# Patient Record
Sex: FEMALE | Race: BLACK OR AFRICAN AMERICAN | Employment: OTHER | URBAN - METROPOLITAN AREA
[De-identification: names, ages, dates, MRNs, and addresses within clinical notes are randomized per-mention and may not be internally consistent; named-entity substitution may affect disease eponyms.]

---

## 2017-07-06 PROBLEM — C50.412 MALIGNANT NEOPLASM OF UPPER-OUTER QUADRANT OF LEFT FEMALE BREAST (HCC): Status: ACTIVE | Noted: 2017-07-06

## 2017-07-14 ENCOUNTER — HOSPITAL ENCOUNTER (OUTPATIENT)
Dept: GENERAL RADIOLOGY | Age: 72
Discharge: HOME | End: 2017-07-14
Attending: SURGERY | Admitting: SURGERY

## 2017-07-14 LAB
BUN BLDV-MCNC: 24 MG/DL (ref 6–23)
CREAT SERPL-MCNC: 1.5 MG/DL (ref 0.6–1.1)
GFR AFRICAN AMERICAN: 41 ML/MIN/1.73M2
GFR NON-AFRICAN AMERICAN: 34 ML/MIN/1.73M2

## 2017-07-18 ENCOUNTER — HOSPITAL ENCOUNTER (OUTPATIENT)
Dept: MRI IMAGING | Age: 72
Discharge: HOME | End: 2017-08-16
Attending: SURGERY | Admitting: SURGERY

## 2017-07-18 DIAGNOSIS — C50.412 MALIGNANT NEOPLASM OF UPPER-OUTER QUADRANT OF LEFT FEMALE BREAST (HCC): ICD-10-CM

## 2017-08-18 ENCOUNTER — HOSPITAL ENCOUNTER (OUTPATIENT)
Dept: OTHER | Age: 72
Discharge: HOME | End: 2017-08-18
Attending: INTERNAL MEDICINE | Admitting: INTERNAL MEDICINE

## 2017-08-18 LAB
ALBUMIN SERPL-MCNC: 4.3 GM/DL (ref 3.4–5)
ALP BLD-CCNC: 95 IU/L (ref 40–129)
ALT SERPL-CCNC: 13 U/L (ref 10–40)
ANION GAP SERPL CALCULATED.3IONS-SCNC: 13 MMOL/L (ref 4–16)
AST SERPL-CCNC: 18 IU/L (ref 15–37)
BILIRUB SERPL-MCNC: 0.7 MG/DL (ref 0–1)
BUN BLDV-MCNC: 20 MG/DL (ref 6–23)
CALCIUM SERPL-MCNC: 9.5 MG/DL (ref 8.3–10.6)
CHLORIDE BLD-SCNC: 105 MMOL/L (ref 99–110)
CO2: 26 MMOL/L (ref 21–32)
CREAT SERPL-MCNC: 1.5 MG/DL (ref 0.6–1.1)
GFR AFRICAN AMERICAN: 41 ML/MIN/1.73M2
GFR NON-AFRICAN AMERICAN: 34 ML/MIN/1.73M2
GLUCOSE BLD-MCNC: 83 MG/DL (ref 70–140)
POTASSIUM SERPL-SCNC: 4.2 MMOL/L (ref 3.5–5.1)
SODIUM BLD-SCNC: 144 MMOL/L (ref 135–145)
TOTAL PROTEIN: 7 GM/DL (ref 6.4–8.2)

## 2017-08-20 LAB — CA 27.29: 25.8 U/ML

## 2017-08-30 ENCOUNTER — HOSPITAL ENCOUNTER (OUTPATIENT)
Dept: CARDIOLOGY | Age: 72
Discharge: HOME | End: 2017-08-30
Attending: INTERNAL MEDICINE | Admitting: INTERNAL MEDICINE

## 2017-08-30 DIAGNOSIS — C50.412 MALIGNANT NEOPLASM OF UPPER-OUTER QUADRANT OF LEFT FEMALE BREAST (HCC): ICD-10-CM

## 2017-08-30 LAB
LV EF: 50 %
LVEF MODALITY: NORMAL

## 2017-09-07 ENCOUNTER — HOSPITAL ENCOUNTER (OUTPATIENT)
Dept: OTHER | Age: 72
Discharge: HOME | End: 2017-09-07
Attending: INTERNAL MEDICINE | Admitting: INTERNAL MEDICINE

## 2017-09-07 LAB
ALBUMIN SERPL-MCNC: 3.9 GM/DL (ref 3.4–5)
ALP BLD-CCNC: 101 IU/L (ref 40–128)
ALT SERPL-CCNC: 14 U/L (ref 10–40)
ANION GAP SERPL CALCULATED.3IONS-SCNC: 13 MMOL/L (ref 4–16)
AST SERPL-CCNC: 20 IU/L (ref 15–37)
BILIRUB SERPL-MCNC: 0.6 MG/DL (ref 0–1)
BUN BLDV-MCNC: 20 MG/DL (ref 6–23)
CALCIUM SERPL-MCNC: 9.7 MG/DL (ref 8.3–10.6)
CHLORIDE BLD-SCNC: 106 MMOL/L (ref 99–110)
CO2: 25 MMOL/L (ref 21–32)
CREAT SERPL-MCNC: 1.3 MG/DL (ref 0.6–1.1)
GFR AFRICAN AMERICAN: 49 ML/MIN/1.73M2
GFR NON-AFRICAN AMERICAN: 40 ML/MIN/1.73M2
GLUCOSE BLD-MCNC: 89 MG/DL (ref 70–140)
POTASSIUM SERPL-SCNC: 4.2 MMOL/L (ref 3.5–5.1)
SODIUM BLD-SCNC: 144 MMOL/L (ref 135–145)
TOTAL PROTEIN: 6.6 GM/DL (ref 6.4–8.2)

## 2017-10-19 ENCOUNTER — HOSPITAL ENCOUNTER (OUTPATIENT)
Dept: OTHER | Age: 72
Discharge: HOME | End: 2017-10-19
Attending: INTERNAL MEDICINE | Admitting: INTERNAL MEDICINE

## 2017-10-19 LAB
ALBUMIN SERPL-MCNC: 3.6 GM/DL (ref 3.4–5)
ALP BLD-CCNC: 96 IU/L (ref 40–128)
ALT SERPL-CCNC: 14 U/L (ref 10–40)
ANION GAP SERPL CALCULATED.3IONS-SCNC: 13 MMOL/L (ref 4–16)
AST SERPL-CCNC: 16 IU/L (ref 15–37)
BILIRUB SERPL-MCNC: 0.2 MG/DL (ref 0–1)
BUN BLDV-MCNC: 22 MG/DL (ref 6–23)
CALCIUM SERPL-MCNC: 9.2 MG/DL (ref 8.3–10.6)
CHLORIDE BLD-SCNC: 105 MMOL/L (ref 99–110)
CO2: 25 MMOL/L (ref 21–32)
CREAT SERPL-MCNC: 1.4 MG/DL (ref 0.6–1.1)
GFR AFRICAN AMERICAN: 45 ML/MIN/1.73M2
GFR NON-AFRICAN AMERICAN: 37 ML/MIN/1.73M2
GLUCOSE BLD-MCNC: 96 MG/DL (ref 70–140)
POTASSIUM SERPL-SCNC: 4.6 MMOL/L (ref 3.5–5.1)
SODIUM BLD-SCNC: 143 MMOL/L (ref 135–145)
TOTAL PROTEIN: 6.1 GM/DL (ref 6.4–8.2)

## 2017-11-09 ENCOUNTER — HOSPITAL ENCOUNTER (OUTPATIENT)
Dept: OTHER | Age: 72
Discharge: HOME | End: 2017-11-09
Attending: INTERNAL MEDICINE | Admitting: INTERNAL MEDICINE

## 2017-11-09 LAB
ALBUMIN SERPL-MCNC: 3.6 GM/DL (ref 3.4–5)
ALP BLD-CCNC: 107 IU/L (ref 40–129)
ALT SERPL-CCNC: 13 U/L (ref 10–40)
ANION GAP SERPL CALCULATED.3IONS-SCNC: 13 MMOL/L (ref 4–16)
AST SERPL-CCNC: 15 IU/L (ref 15–37)
BILIRUB SERPL-MCNC: 0.2 MG/DL (ref 0–1)
BUN BLDV-MCNC: 20 MG/DL (ref 6–23)
CALCIUM SERPL-MCNC: 9.2 MG/DL (ref 8.3–10.6)
CHLORIDE BLD-SCNC: 104 MMOL/L (ref 99–110)
CO2: 24 MMOL/L (ref 21–32)
CREAT SERPL-MCNC: 1.2 MG/DL (ref 0.6–1.1)
GFR AFRICAN AMERICAN: 53 ML/MIN/1.73M2
GFR NON-AFRICAN AMERICAN: 44 ML/MIN/1.73M2
GLUCOSE BLD-MCNC: 93 MG/DL (ref 70–140)
POTASSIUM SERPL-SCNC: 4.3 MMOL/L (ref 3.5–5.1)
SODIUM BLD-SCNC: 141 MMOL/L (ref 135–145)
TOTAL PROTEIN: 5.9 GM/DL (ref 6.4–8.2)

## 2017-11-30 ENCOUNTER — HOSPITAL ENCOUNTER (OUTPATIENT)
Dept: OTHER | Age: 72
Discharge: HOME | End: 2017-11-30
Attending: INTERNAL MEDICINE | Admitting: INTERNAL MEDICINE

## 2017-11-30 LAB
ALBUMIN SERPL-MCNC: 3.9 GM/DL (ref 3.4–5)
ALP BLD-CCNC: 102 IU/L (ref 40–128)
ALT SERPL-CCNC: 14 U/L (ref 10–40)
ANION GAP SERPL CALCULATED.3IONS-SCNC: 14 MMOL/L (ref 4–16)
AST SERPL-CCNC: 19 IU/L (ref 15–37)
BILIRUB SERPL-MCNC: 0.3 MG/DL (ref 0–1)
BUN BLDV-MCNC: 17 MG/DL (ref 6–23)
CALCIUM SERPL-MCNC: 9.6 MG/DL (ref 8.3–10.6)
CHLORIDE BLD-SCNC: 107 MMOL/L (ref 99–110)
CO2: 21 MMOL/L (ref 21–32)
CREAT SERPL-MCNC: 1.2 MG/DL (ref 0.6–1.1)
GFR AFRICAN AMERICAN: 53 ML/MIN/1.73M2
GFR NON-AFRICAN AMERICAN: 44 ML/MIN/1.73M2
GLUCOSE BLD-MCNC: 157 MG/DL (ref 70–99)
POTASSIUM SERPL-SCNC: 4.1 MMOL/L (ref 3.5–5.1)
SODIUM BLD-SCNC: 142 MMOL/L (ref 135–145)
TOTAL PROTEIN: 6.5 GM/DL (ref 6.4–8.2)

## 2017-12-07 ENCOUNTER — HOSPITAL ENCOUNTER (OUTPATIENT)
Dept: OTHER | Age: 72
Discharge: HOME | End: 2017-12-07
Attending: INTERNAL MEDICINE | Admitting: INTERNAL MEDICINE

## 2017-12-07 LAB
ALBUMIN SERPL-MCNC: 3.5 GM/DL (ref 3.4–5)
ALP BLD-CCNC: 91 IU/L (ref 40–128)
ALT SERPL-CCNC: 14 U/L (ref 10–40)
ANION GAP SERPL CALCULATED.3IONS-SCNC: 12 MMOL/L (ref 4–16)
AST SERPL-CCNC: 19 IU/L (ref 15–37)
BILIRUB SERPL-MCNC: 0.3 MG/DL (ref 0–1)
BUN BLDV-MCNC: 17 MG/DL (ref 6–23)
CALCIUM SERPL-MCNC: 9.2 MG/DL (ref 8.3–10.6)
CHLORIDE BLD-SCNC: 106 MMOL/L (ref 99–110)
CO2: 25 MMOL/L (ref 21–32)
CREAT SERPL-MCNC: 1.3 MG/DL (ref 0.6–1.1)
GFR AFRICAN AMERICAN: 49 ML/MIN/1.73M2
GFR NON-AFRICAN AMERICAN: 40 ML/MIN/1.73M2
GLUCOSE BLD-MCNC: 93 MG/DL (ref 70–99)
POTASSIUM SERPL-SCNC: 4.3 MMOL/L (ref 3.5–5.1)
SODIUM BLD-SCNC: 143 MMOL/L (ref 135–145)
TOTAL PROTEIN: 6 GM/DL (ref 6.4–8.2)

## 2017-12-14 ENCOUNTER — HOSPITAL ENCOUNTER (OUTPATIENT)
Dept: OTHER | Age: 72
Discharge: HOME | End: 2017-12-14
Attending: INTERNAL MEDICINE | Admitting: INTERNAL MEDICINE

## 2017-12-14 LAB
ALBUMIN SERPL-MCNC: 3.8 GM/DL (ref 3.4–5)
ALP BLD-CCNC: 72 IU/L (ref 40–129)
ALT SERPL-CCNC: 13 U/L (ref 10–40)
ANION GAP SERPL CALCULATED.3IONS-SCNC: 13 MMOL/L (ref 4–16)
AST SERPL-CCNC: 18 IU/L (ref 15–37)
BILIRUB SERPL-MCNC: 0.4 MG/DL (ref 0–1)
BUN BLDV-MCNC: 17 MG/DL (ref 6–23)
CALCIUM SERPL-MCNC: 9.1 MG/DL (ref 8.3–10.6)
CHLORIDE BLD-SCNC: 106 MMOL/L (ref 99–110)
CO2: 24 MMOL/L (ref 21–32)
CREAT SERPL-MCNC: 1.3 MG/DL (ref 0.6–1.1)
GFR AFRICAN AMERICAN: 49 ML/MIN/1.73M2
GFR NON-AFRICAN AMERICAN: 40 ML/MIN/1.73M2
GLUCOSE BLD-MCNC: 82 MG/DL (ref 70–99)
POTASSIUM SERPL-SCNC: 3.9 MMOL/L (ref 3.5–5.1)
SODIUM BLD-SCNC: 143 MMOL/L (ref 135–145)
TOTAL PROTEIN: 5.9 GM/DL (ref 6.4–8.2)

## 2017-12-21 ENCOUNTER — HOSPITAL ENCOUNTER (OUTPATIENT)
Dept: OTHER | Age: 72
Discharge: HOME | End: 2017-12-21
Attending: INTERNAL MEDICINE | Admitting: INTERNAL MEDICINE

## 2017-12-21 LAB
ALBUMIN SERPL-MCNC: 3.5 GM/DL (ref 3.4–5)
ALP BLD-CCNC: 92 IU/L (ref 40–128)
ALT SERPL-CCNC: 14 U/L (ref 10–40)
ANION GAP SERPL CALCULATED.3IONS-SCNC: 13 MMOL/L (ref 4–16)
AST SERPL-CCNC: 23 IU/L (ref 15–37)
BILIRUB SERPL-MCNC: 0.6 MG/DL (ref 0–1)
BUN BLDV-MCNC: 12 MG/DL (ref 6–23)
CALCIUM SERPL-MCNC: 8.9 MG/DL (ref 8.3–10.6)
CHLORIDE BLD-SCNC: 107 MMOL/L (ref 99–110)
CO2: 23 MMOL/L (ref 21–32)
CREAT SERPL-MCNC: 1.2 MG/DL (ref 0.6–1.1)
GFR AFRICAN AMERICAN: 53 ML/MIN/1.73M2
GFR NON-AFRICAN AMERICAN: 44 ML/MIN/1.73M2
GLUCOSE BLD-MCNC: 102 MG/DL (ref 70–99)
POTASSIUM SERPL-SCNC: 3.7 MMOL/L (ref 3.5–5.1)
SODIUM BLD-SCNC: 143 MMOL/L (ref 135–145)
TOTAL PROTEIN: 5.9 GM/DL (ref 6.4–8.2)

## 2017-12-28 ENCOUNTER — HOSPITAL ENCOUNTER (OUTPATIENT)
Dept: OTHER | Age: 72
Discharge: HOME | End: 2017-12-28
Attending: INTERNAL MEDICINE | Admitting: INTERNAL MEDICINE

## 2017-12-28 LAB
ALBUMIN SERPL-MCNC: 3.9 GM/DL (ref 3.4–5)
ALP BLD-CCNC: 82 IU/L (ref 40–128)
ALT SERPL-CCNC: 16 U/L (ref 10–40)
ANION GAP SERPL CALCULATED.3IONS-SCNC: 13 MMOL/L (ref 4–16)
AST SERPL-CCNC: 23 IU/L (ref 15–37)
BILIRUB SERPL-MCNC: 0.5 MG/DL (ref 0–1)
BUN BLDV-MCNC: 15 MG/DL (ref 6–23)
CALCIUM SERPL-MCNC: 9.5 MG/DL (ref 8.3–10.6)
CHLORIDE BLD-SCNC: 106 MMOL/L (ref 99–110)
CO2: 25 MMOL/L (ref 21–32)
CREAT SERPL-MCNC: 1.2 MG/DL (ref 0.6–1.1)
GFR AFRICAN AMERICAN: 53 ML/MIN/1.73M2
GFR NON-AFRICAN AMERICAN: 44 ML/MIN/1.73M2
GLUCOSE BLD-MCNC: 94 MG/DL (ref 70–99)
POTASSIUM SERPL-SCNC: 3.9 MMOL/L (ref 3.5–5.1)
SODIUM BLD-SCNC: 144 MMOL/L (ref 135–145)
TOTAL PROTEIN: 6.1 GM/DL (ref 6.4–8.2)

## 2018-01-12 ENCOUNTER — HOSPITAL ENCOUNTER (OUTPATIENT)
Dept: OTHER | Age: 73
Discharge: HOME | End: 2018-01-12
Attending: INTERNAL MEDICINE | Admitting: INTERNAL MEDICINE

## 2018-01-12 LAB
ALBUMIN SERPL-MCNC: 3.8 GM/DL (ref 3.4–5)
ALP BLD-CCNC: 97 IU/L (ref 40–128)
ALT SERPL-CCNC: 12 U/L (ref 10–40)
ANION GAP SERPL CALCULATED.3IONS-SCNC: 13 MMOL/L (ref 4–16)
AST SERPL-CCNC: 21 IU/L (ref 15–37)
BILIRUB SERPL-MCNC: 0.4 MG/DL (ref 0–1)
BUN BLDV-MCNC: 19 MG/DL (ref 6–23)
CALCIUM SERPL-MCNC: 9.4 MG/DL (ref 8.3–10.6)
CHLORIDE BLD-SCNC: 106 MMOL/L (ref 99–110)
CO2: 26 MMOL/L (ref 21–32)
CREAT SERPL-MCNC: 1.2 MG/DL (ref 0.6–1.1)
GFR AFRICAN AMERICAN: 53 ML/MIN/1.73M2
GFR NON-AFRICAN AMERICAN: 44 ML/MIN/1.73M2
GLUCOSE BLD-MCNC: 97 MG/DL (ref 70–99)
POTASSIUM SERPL-SCNC: 4.1 MMOL/L (ref 3.5–5.1)
SODIUM BLD-SCNC: 145 MMOL/L (ref 135–145)
TOTAL PROTEIN: 5.9 GM/DL (ref 6.4–8.2)

## 2018-01-19 ENCOUNTER — HOSPITAL ENCOUNTER (OUTPATIENT)
Dept: OTHER | Age: 73
Discharge: HOME | End: 2018-01-19
Attending: INTERNAL MEDICINE | Admitting: INTERNAL MEDICINE

## 2018-01-19 LAB
ALBUMIN SERPL-MCNC: 3.8 GM/DL (ref 3.4–5)
ALP BLD-CCNC: 96 IU/L (ref 40–128)
ALT SERPL-CCNC: 13 U/L (ref 10–40)
ANION GAP SERPL CALCULATED.3IONS-SCNC: 13 MMOL/L (ref 4–16)
AST SERPL-CCNC: 20 IU/L (ref 15–37)
BILIRUB SERPL-MCNC: 0.3 MG/DL (ref 0–1)
BUN BLDV-MCNC: 18 MG/DL (ref 6–23)
CALCIUM SERPL-MCNC: 9.7 MG/DL (ref 8.3–10.6)
CHLORIDE BLD-SCNC: 106 MMOL/L (ref 99–110)
CO2: 25 MMOL/L (ref 21–32)
CREAT SERPL-MCNC: 1.2 MG/DL (ref 0.6–1.1)
GFR AFRICAN AMERICAN: 53 ML/MIN/1.73M2
GFR NON-AFRICAN AMERICAN: 44 ML/MIN/1.73M2
GLUCOSE BLD-MCNC: 100 MG/DL (ref 70–99)
POTASSIUM SERPL-SCNC: 4.4 MMOL/L (ref 3.5–5.1)
SODIUM BLD-SCNC: 144 MMOL/L (ref 135–145)
TOTAL PROTEIN: 6 GM/DL (ref 6.4–8.2)

## 2018-01-26 ENCOUNTER — HOSPITAL ENCOUNTER (OUTPATIENT)
Dept: OTHER | Age: 73
Discharge: HOME | End: 2018-01-26
Attending: INTERNAL MEDICINE | Admitting: INTERNAL MEDICINE

## 2018-01-26 LAB
ALBUMIN SERPL-MCNC: 3.7 GM/DL (ref 3.4–5)
ALP BLD-CCNC: 85 IU/L (ref 40–129)
ALT SERPL-CCNC: 13 U/L (ref 10–40)
ANION GAP SERPL CALCULATED.3IONS-SCNC: 12 MMOL/L (ref 4–16)
AST SERPL-CCNC: 19 IU/L (ref 15–37)
BILIRUB SERPL-MCNC: 0.3 MG/DL (ref 0–1)
BUN BLDV-MCNC: 19 MG/DL (ref 6–23)
CALCIUM SERPL-MCNC: 9.2 MG/DL (ref 8.3–10.6)
CHLORIDE BLD-SCNC: 103 MMOL/L (ref 99–110)
CO2: 24 MMOL/L (ref 21–32)
CREAT SERPL-MCNC: 1.2 MG/DL (ref 0.6–1.1)
GFR AFRICAN AMERICAN: 53 ML/MIN/1.73M2
GFR NON-AFRICAN AMERICAN: 44 ML/MIN/1.73M2
GLUCOSE BLD-MCNC: 91 MG/DL (ref 70–99)
POTASSIUM SERPL-SCNC: 4.2 MMOL/L (ref 3.5–5.1)
SODIUM BLD-SCNC: 139 MMOL/L (ref 135–145)
TOTAL PROTEIN: 5.9 GM/DL (ref 6.4–8.2)

## 2018-02-09 ENCOUNTER — HOSPITAL ENCOUNTER (OUTPATIENT)
Dept: OTHER | Age: 73
Discharge: HOME | End: 2018-02-09
Attending: NURSE PRACTITIONER | Admitting: NURSE PRACTITIONER

## 2018-02-09 LAB
ALBUMIN SERPL-MCNC: 3.8 GM/DL (ref 3.4–5)
ALP BLD-CCNC: 79 IU/L (ref 40–128)
ALT SERPL-CCNC: 13 U/L (ref 10–40)
ANION GAP SERPL CALCULATED.3IONS-SCNC: 13 MMOL/L (ref 4–16)
AST SERPL-CCNC: 20 IU/L (ref 15–37)
BILIRUB SERPL-MCNC: 0.4 MG/DL (ref 0–1)
BUN BLDV-MCNC: 15 MG/DL (ref 6–23)
CALCIUM SERPL-MCNC: 9 MG/DL (ref 8.3–10.6)
CHLORIDE BLD-SCNC: 105 MMOL/L (ref 99–110)
CO2: 24 MMOL/L (ref 21–32)
CREAT SERPL-MCNC: 1.2 MG/DL (ref 0.6–1.1)
GFR AFRICAN AMERICAN: 53 ML/MIN/1.73M2
GFR NON-AFRICAN AMERICAN: 44 ML/MIN/1.73M2
GLUCOSE BLD-MCNC: 92 MG/DL (ref 70–99)
POTASSIUM SERPL-SCNC: 4.2 MMOL/L (ref 3.5–5.1)
SODIUM BLD-SCNC: 142 MMOL/L (ref 135–145)
TOTAL PROTEIN: 5.9 GM/DL (ref 6.4–8.2)

## 2018-02-16 ENCOUNTER — HOSPITAL ENCOUNTER (OUTPATIENT)
Dept: OTHER | Age: 73
Discharge: HOME | End: 2018-02-16
Attending: INTERNAL MEDICINE | Admitting: INTERNAL MEDICINE

## 2018-02-16 LAB
ALBUMIN SERPL-MCNC: 3.9 GM/DL (ref 3.4–5)
ALP BLD-CCNC: 79 IU/L (ref 40–128)
ALT SERPL-CCNC: 15 U/L (ref 10–40)
ANION GAP SERPL CALCULATED.3IONS-SCNC: 16 MMOL/L (ref 4–16)
AST SERPL-CCNC: 22 IU/L (ref 15–37)
BILIRUB SERPL-MCNC: 0.5 MG/DL (ref 0–1)
BUN BLDV-MCNC: 13 MG/DL (ref 6–23)
CALCIUM SERPL-MCNC: 9.2 MG/DL (ref 8.3–10.6)
CHLORIDE BLD-SCNC: 104 MMOL/L (ref 99–110)
CO2: 22 MMOL/L (ref 21–32)
CREAT SERPL-MCNC: 1.3 MG/DL (ref 0.6–1.1)
GFR AFRICAN AMERICAN: 48 ML/MIN/1.73M2
GFR NON-AFRICAN AMERICAN: 40 ML/MIN/1.73M2
GLUCOSE BLD-MCNC: 108 MG/DL (ref 70–99)
POTASSIUM SERPL-SCNC: 4 MMOL/L (ref 3.5–5.1)
SODIUM BLD-SCNC: 142 MMOL/L (ref 135–145)
TOTAL PROTEIN: 6.2 GM/DL (ref 6.4–8.2)

## 2018-02-23 ENCOUNTER — HOSPITAL ENCOUNTER (OUTPATIENT)
Dept: OTHER | Age: 73
Discharge: HOME | End: 2018-02-23
Attending: INTERNAL MEDICINE | Admitting: INTERNAL MEDICINE

## 2018-02-23 LAB
ALBUMIN SERPL-MCNC: 3.8 GM/DL (ref 3.4–5)
ALP BLD-CCNC: 76 IU/L (ref 40–129)
ALT SERPL-CCNC: 13 U/L (ref 10–40)
ANION GAP SERPL CALCULATED.3IONS-SCNC: 13 MMOL/L (ref 4–16)
AST SERPL-CCNC: 20 IU/L (ref 15–37)
BILIRUB SERPL-MCNC: 0.5 MG/DL (ref 0–1)
BUN BLDV-MCNC: 14 MG/DL (ref 6–23)
CALCIUM SERPL-MCNC: 9.5 MG/DL (ref 8.3–10.6)
CHLORIDE BLD-SCNC: 102 MMOL/L (ref 99–110)
CO2: 24 MMOL/L (ref 21–32)
CREAT SERPL-MCNC: 1.2 MG/DL (ref 0.6–1.1)
GFR AFRICAN AMERICAN: 53 ML/MIN/1.73M2
GFR NON-AFRICAN AMERICAN: 44 ML/MIN/1.73M2
GLUCOSE BLD-MCNC: 92 MG/DL (ref 70–99)
POTASSIUM SERPL-SCNC: 3.7 MMOL/L (ref 3.5–5.1)
SODIUM BLD-SCNC: 139 MMOL/L (ref 135–145)
TOTAL PROTEIN: 5.9 GM/DL (ref 6.4–8.2)

## 2018-04-02 PROBLEM — I82.A12 ACUTE DEEP VEIN THROMBOSIS (DVT) OF AXILLARY VEIN OF LEFT UPPER EXTREMITY (HCC): Status: ACTIVE | Noted: 2018-04-02

## 2018-04-02 PROBLEM — M79.89 LEFT ARM SWELLING: Status: ACTIVE | Noted: 2018-04-02

## 2018-04-08 PROBLEM — L03.119 CELLULITIS OF ANKLE: Status: ACTIVE | Noted: 2018-04-08

## 2018-04-08 PROBLEM — M10.9 ACUTE GOUT OF RIGHT ANKLE: Status: ACTIVE | Noted: 2018-04-08

## 2018-09-11 ENCOUNTER — HOSPITAL ENCOUNTER (OUTPATIENT)
Dept: OTHER | Age: 73
Discharge: HOME | End: 2018-09-11
Attending: INTERNAL MEDICINE | Admitting: INTERNAL MEDICINE

## 2018-09-11 LAB
ALBUMIN SERPL-MCNC: 4 GM/DL (ref 3.4–5)
ALP BLD-CCNC: 119 IU/L (ref 40–128)
ALT SERPL-CCNC: 16 U/L (ref 10–40)
ANION GAP SERPL CALCULATED.3IONS-SCNC: 12 MMOL/L (ref 4–16)
AST SERPL-CCNC: 25 IU/L (ref 15–37)
BILIRUB SERPL-MCNC: 0.6 MG/DL (ref 0–1)
BUN BLDV-MCNC: 20 MG/DL (ref 6–23)
CALCIUM SERPL-MCNC: 9.9 MG/DL (ref 8.3–10.6)
CHLORIDE BLD-SCNC: 105 MMOL/L (ref 99–110)
CO2: 25 MMOL/L (ref 21–32)
CREAT SERPL-MCNC: 1.3 MG/DL (ref 0.6–1.1)
GFR AFRICAN AMERICAN: 48 ML/MIN/1.73M2
GFR NON-AFRICAN AMERICAN: 40 ML/MIN/1.73M2
GLUCOSE BLD-MCNC: 94 MG/DL (ref 70–99)
POTASSIUM SERPL-SCNC: 4.5 MMOL/L (ref 3.5–5.1)
SODIUM BLD-SCNC: 142 MMOL/L (ref 135–145)
TOTAL PROTEIN: 6.1 GM/DL (ref 6.4–8.2)

## 2018-09-13 LAB — CA 27.29: 21.5 U/ML

## 2018-12-11 ENCOUNTER — HOSPITAL ENCOUNTER (OUTPATIENT)
Age: 73
Setting detail: SPECIMEN
Discharge: HOME | End: 2018-12-11
Payer: MEDICARE

## 2018-12-11 LAB
ALBUMIN SERPL-MCNC: 3.6 GM/DL (ref 3.4–5)
ALP BLD-CCNC: 121 IU/L (ref 40–129)
ALT SERPL-CCNC: 15 U/L (ref 10–40)
ANION GAP SERPL CALCULATED.3IONS-SCNC: 14 MMOL/L (ref 4–16)
AST SERPL-CCNC: 20 IU/L (ref 15–37)
BILIRUB SERPL-MCNC: 0.5 MG/DL (ref 0–1)
BUN BLDV-MCNC: 27 MG/DL (ref 6–23)
CALCIUM SERPL-MCNC: 9.2 MG/DL (ref 8.3–10.6)
CHLORIDE BLD-SCNC: 104 MMOL/L (ref 99–110)
CO2: 26 MMOL/L (ref 21–32)
CREAT SERPL-MCNC: 1.5 MG/DL (ref 0.6–1.1)
GFR AFRICAN AMERICAN: 41 ML/MIN/1.73M2
GFR NON-AFRICAN AMERICAN: 34 ML/MIN/1.73M2
GLUCOSE BLD-MCNC: 84 MG/DL (ref 70–99)
POTASSIUM SERPL-SCNC: 4.4 MMOL/L (ref 3.5–5.1)
SODIUM BLD-SCNC: 144 MMOL/L (ref 135–145)
TOTAL PROTEIN: 6.6 GM/DL (ref 6.4–8.2)

## 2018-12-11 PROCEDURE — 80053 COMPREHEN METABOLIC PANEL: CPT

## 2018-12-11 PROCEDURE — 86300 IMMUNOASSAY TUMOR CA 15-3: CPT

## 2018-12-13 LAB — CA 27.29: 24.9 U/ML

## 2019-01-21 PROBLEM — R29.898 LEFT ARM WEAKNESS: Status: ACTIVE | Noted: 2019-01-21

## 2019-01-21 PROBLEM — M54.42 CHRONIC BILATERAL LOW BACK PAIN WITH LEFT-SIDED SCIATICA: Status: ACTIVE | Noted: 2019-01-21

## 2019-01-21 PROBLEM — G89.29 CHRONIC BILATERAL LOW BACK PAIN WITH LEFT-SIDED SCIATICA: Status: ACTIVE | Noted: 2019-01-21

## 2019-01-23 ENCOUNTER — HOSPITAL ENCOUNTER (OUTPATIENT)
Dept: NUCLEAR MEDICINE | Age: 74
Discharge: HOME | End: 2019-01-23
Payer: MEDICARE

## 2019-01-23 DIAGNOSIS — M54.42 CHRONIC BILATERAL LOW BACK PAIN WITH LEFT-SIDED SCIATICA: ICD-10-CM

## 2019-01-23 DIAGNOSIS — G89.29 CHRONIC BILATERAL LOW BACK PAIN WITH LEFT-SIDED SCIATICA: ICD-10-CM

## 2019-01-23 DIAGNOSIS — C50.412 MALIGNANT NEOPLASM OF UPPER-OUTER QUADRANT OF LEFT FEMALE BREAST, UNSPECIFIED ESTROGEN RECEPTOR STATUS (HCC): ICD-10-CM

## 2019-01-23 PROCEDURE — 3430000000 HC RX DIAGNOSTIC RADIOPHARMACEUTICAL: Performed by: SURGERY

## 2019-01-23 PROCEDURE — A9503 TC99M MEDRONATE: HCPCS | Performed by: SURGERY

## 2019-01-23 PROCEDURE — 78306 BONE IMAGING WHOLE BODY: CPT

## 2019-01-23 RX ORDER — TC 99M MEDRONATE 20 MG/10ML
25 INJECTION, POWDER, LYOPHILIZED, FOR SOLUTION INTRAVENOUS
Status: COMPLETED | OUTPATIENT
Start: 2019-01-23 | End: 2019-01-23

## 2019-01-23 RX ADMIN — TC 99M MEDRONATE 25 MILLICURIE: 20 INJECTION, POWDER, LYOPHILIZED, FOR SOLUTION INTRAVENOUS at 10:50

## 2019-03-12 ENCOUNTER — HOSPITAL ENCOUNTER (OUTPATIENT)
Age: 74
Setting detail: SPECIMEN
Discharge: HOME | End: 2019-03-12
Payer: MEDICARE

## 2019-03-12 LAB
ALBUMIN SERPL-MCNC: 4 GM/DL (ref 3.4–5)
ALP BLD-CCNC: 114 IU/L (ref 40–129)
ALT SERPL-CCNC: 14 U/L (ref 10–40)
ANION GAP SERPL CALCULATED.3IONS-SCNC: 7 MMOL/L (ref 4–16)
AST SERPL-CCNC: 20 IU/L (ref 15–37)
BILIRUB SERPL-MCNC: 0.4 MG/DL (ref 0–1)
BUN BLDV-MCNC: 21 MG/DL (ref 6–23)
CALCIUM SERPL-MCNC: 9.2 MG/DL (ref 8.3–10.6)
CHLORIDE BLD-SCNC: 105 MMOL/L (ref 99–110)
CO2: 29 MMOL/L (ref 21–32)
CREAT SERPL-MCNC: 1.4 MG/DL (ref 0.6–1.1)
GFR AFRICAN AMERICAN: 44 ML/MIN/1.73M2
GFR NON-AFRICAN AMERICAN: 37 ML/MIN/1.73M2
GLUCOSE BLD-MCNC: 70 MG/DL (ref 70–99)
POTASSIUM SERPL-SCNC: 4.1 MMOL/L (ref 3.5–5.1)
SODIUM BLD-SCNC: 141 MMOL/L (ref 135–145)
TOTAL PROTEIN: 6.7 GM/DL (ref 6.4–8.2)

## 2019-03-12 PROCEDURE — 80053 COMPREHEN METABOLIC PANEL: CPT

## 2019-03-12 PROCEDURE — 86300 IMMUNOASSAY TUMOR CA 15-3: CPT

## 2019-03-14 LAB
CA 27.29: 26.3 U/ML (ref 0–40)
CA 27.29: NORMAL U/ML (ref 0–40)

## 2019-07-18 ENCOUNTER — HOSPITAL ENCOUNTER (OUTPATIENT)
Age: 74
Setting detail: SPECIMEN
Discharge: HOME | End: 2019-07-18
Payer: MEDICARE

## 2019-07-18 LAB
ALBUMIN SERPL-MCNC: 4 GM/DL (ref 3.4–5)
ALP BLD-CCNC: 117 IU/L (ref 40–128)
ALT SERPL-CCNC: 14 U/L (ref 10–40)
ANION GAP SERPL CALCULATED.3IONS-SCNC: 10 MMOL/L (ref 4–16)
AST SERPL-CCNC: 19 IU/L (ref 15–37)
BILIRUB SERPL-MCNC: 0.6 MG/DL (ref 0–1)
BUN BLDV-MCNC: 22 MG/DL (ref 6–23)
CALCIUM SERPL-MCNC: 9.6 MG/DL (ref 8.3–10.6)
CHLORIDE BLD-SCNC: 106 MMOL/L (ref 99–110)
CO2: 26 MMOL/L (ref 21–32)
CREAT SERPL-MCNC: 1.5 MG/DL (ref 0.6–1.1)
GFR AFRICAN AMERICAN: 41 ML/MIN/1.73M2
GFR NON-AFRICAN AMERICAN: 34 ML/MIN/1.73M2
GLUCOSE BLD-MCNC: 101 MG/DL (ref 70–99)
POTASSIUM SERPL-SCNC: 4.1 MMOL/L (ref 3.5–5.1)
SODIUM BLD-SCNC: 142 MMOL/L (ref 135–145)
TOTAL PROTEIN: 6.6 GM/DL (ref 6.4–8.2)

## 2019-07-18 PROCEDURE — 86300 IMMUNOASSAY TUMOR CA 15-3: CPT

## 2019-07-18 PROCEDURE — 80053 COMPREHEN METABOLIC PANEL: CPT

## 2019-07-20 LAB
CA 27.29: 22.1 U/ML (ref 0–40)
CA 27.29: NORMAL U/ML (ref 0–40)

## 2019-07-25 ENCOUNTER — HOSPITAL ENCOUNTER (OUTPATIENT)
Dept: CT IMAGING | Age: 74
Discharge: HOME | End: 2019-07-25
Payer: MEDICARE

## 2019-07-25 DIAGNOSIS — C50.412 MALIGNANT NEOPLASM OF UPPER-OUTER QUADRANT OF LEFT FEMALE BREAST, UNSPECIFIED ESTROGEN RECEPTOR STATUS (HCC): ICD-10-CM

## 2019-07-25 LAB
GFR AFRICAN AMERICAN: 46 ML/MIN/1.73M2
GFR NON-AFRICAN AMERICAN: 38 ML/MIN/1.73M2
POC CREATININE: 1.4 MG/DL (ref 0.6–1.1)

## 2019-07-25 PROCEDURE — 6360000004 HC RX CONTRAST MEDICATION: Performed by: INTERNAL MEDICINE

## 2019-07-25 PROCEDURE — 71260 CT THORAX DX C+: CPT

## 2019-07-25 PROCEDURE — 70491 CT SOFT TISSUE NECK W/DYE: CPT

## 2019-07-25 RX ADMIN — IOPAMIDOL 75 ML: 755 INJECTION, SOLUTION INTRAVENOUS at 14:20

## 2019-11-07 ENCOUNTER — HOSPITAL ENCOUNTER (OUTPATIENT)
Dept: CT IMAGING | Age: 74
Discharge: HOME | End: 2019-11-07
Payer: MEDICARE

## 2019-11-07 DIAGNOSIS — C50.412 MALIGNANT NEOPLASM OF UPPER-OUTER QUADRANT OF LEFT FEMALE BREAST, UNSPECIFIED ESTROGEN RECEPTOR STATUS (HCC): ICD-10-CM

## 2019-11-07 LAB
GFR AFRICAN AMERICAN: 43 ML/MIN/1.73M2
GFR NON-AFRICAN AMERICAN: 36 ML/MIN/1.73M2
POC CREATININE: 1.4 MG/DL (ref 0.6–1.1)

## 2019-11-07 PROCEDURE — 6360000004 HC RX CONTRAST MEDICATION: Performed by: INTERNAL MEDICINE

## 2019-11-07 PROCEDURE — 71260 CT THORAX DX C+: CPT

## 2019-11-07 RX ADMIN — IOPAMIDOL 75 ML: 755 INJECTION, SOLUTION INTRAVENOUS at 10:40

## 2019-11-07 RX ADMIN — IOPAMIDOL 75 ML: 755 INJECTION, SOLUTION INTRAVENOUS at 10:39

## 2020-02-25 ENCOUNTER — HOSPITAL ENCOUNTER (OUTPATIENT)
Dept: INFUSION THERAPY | Age: 75
Discharge: HOME | End: 2020-02-25
Payer: MEDICARE

## 2020-02-25 LAB
ALBUMIN SERPL-MCNC: 3.9 GM/DL (ref 3.4–5)
ALP BLD-CCNC: 109 IU/L (ref 40–129)
ALT SERPL-CCNC: 18 U/L (ref 10–40)
ANION GAP SERPL CALCULATED.3IONS-SCNC: 12 MMOL/L (ref 4–16)
AST SERPL-CCNC: 22 IU/L (ref 15–37)
BASOPHILS ABSOLUTE: 0 K/CU MM
BASOPHILS RELATIVE PERCENT: 0 % (ref 0–1)
BILIRUB SERPL-MCNC: 0.5 MG/DL (ref 0–1)
BUN BLDV-MCNC: 20 MG/DL (ref 6–23)
CALCIUM SERPL-MCNC: 8.9 MG/DL (ref 8.3–10.6)
CHLORIDE BLD-SCNC: 104 MMOL/L (ref 99–110)
CO2: 24 MMOL/L (ref 21–32)
CREAT SERPL-MCNC: 1.4 MG/DL (ref 0.6–1.1)
DIFFERENTIAL TYPE: ABNORMAL
EOSINOPHILS ABSOLUTE: 0.2 K/CU MM
EOSINOPHILS RELATIVE PERCENT: 2.8 % (ref 0–3)
GFR AFRICAN AMERICAN: 44 ML/MIN/1.73M2
GFR NON-AFRICAN AMERICAN: 37 ML/MIN/1.73M2
GLUCOSE BLD-MCNC: 90 MG/DL (ref 70–99)
HCT VFR BLD CALC: 39.6 % (ref 37–47)
HEMOGLOBIN: 13.3 GM/DL (ref 12.5–16)
LYMPHOCYTES ABSOLUTE: 1.7 K/CU MM
LYMPHOCYTES RELATIVE PERCENT: 29.7 % (ref 24–44)
MCH RBC QN AUTO: 27.1 PG (ref 27–31)
MCHC RBC AUTO-ENTMCNC: 33.6 % (ref 32–36)
MCV RBC AUTO: 80.7 FL (ref 78–100)
MONOCYTES ABSOLUTE: 0.8 K/CU MM
MONOCYTES RELATIVE PERCENT: 14.2 % (ref 0–4)
PDW BLD-RTO: 18.2 % (ref 11.7–14.9)
PLATELET # BLD: 152 K/CU MM (ref 140–440)
PMV BLD AUTO: 11 FL (ref 7.5–11.1)
POTASSIUM SERPL-SCNC: 4 MMOL/L (ref 3.5–5.1)
RBC # BLD: 4.91 M/CU MM (ref 4.2–5.4)
SEGMENTED NEUTROPHILS ABSOLUTE COUNT: 3.1 K/CU MM
SEGMENTED NEUTROPHILS RELATIVE PERCENT: 53.3 % (ref 36–66)
SODIUM BLD-SCNC: 140 MMOL/L (ref 135–145)
TOTAL PROTEIN: 6.7 GM/DL (ref 6.4–8.2)
WBC # BLD: 5.8 K/CU MM (ref 4–10.5)

## 2020-02-25 PROCEDURE — 85025 COMPLETE CBC W/AUTO DIFF WBC: CPT

## 2020-02-25 PROCEDURE — 36415 COLL VENOUS BLD VENIPUNCTURE: CPT

## 2020-02-25 PROCEDURE — 86300 IMMUNOASSAY TUMOR CA 15-3: CPT

## 2020-02-25 PROCEDURE — 80053 COMPREHEN METABOLIC PANEL: CPT

## 2020-02-27 LAB
CA 27.29: 23.1 U/ML (ref 0–40)
CA 27.29: NORMAL U/ML (ref 0–40)

## 2020-03-05 ENCOUNTER — HOSPITAL ENCOUNTER (OUTPATIENT)
Age: 75
Discharge: HOME | End: 2020-03-05
Payer: MEDICARE

## 2020-03-05 ENCOUNTER — HOSPITAL ENCOUNTER (OUTPATIENT)
Dept: CT IMAGING | Age: 75
Discharge: HOME | End: 2020-03-05
Payer: MEDICARE

## 2020-03-05 LAB
GFR AFRICAN AMERICAN: 39 ML/MIN/1.73M2
GFR NON-AFRICAN AMERICAN: 32 ML/MIN/1.73M2
POC CREATININE: 1.6 MG/DL (ref 0.6–1.1)

## 2020-03-05 PROCEDURE — 71250 CT THORAX DX C-: CPT

## 2020-03-05 PROCEDURE — 36415 COLL VENOUS BLD VENIPUNCTURE: CPT

## 2020-03-05 PROCEDURE — 86304 IMMUNOASSAY TUMOR CA 125: CPT

## 2020-03-05 PROCEDURE — 2580000003 HC RX 258: Performed by: INTERNAL MEDICINE

## 2020-03-05 RX ORDER — SODIUM CHLORIDE 0.9 % (FLUSH) 0.9 %
10 SYRINGE (ML) INJECTION PRN
Status: DISCONTINUED | OUTPATIENT
Start: 2020-03-05 | End: 2020-03-06 | Stop reason: HOSPADM

## 2020-03-05 RX ADMIN — Medication 10 ML: at 10:50

## 2020-03-07 LAB
CA 125: 11 U/ML (ref 0–35)
CA 125: NORMAL U/ML (ref 0–35)

## 2020-07-09 ENCOUNTER — HOSPITAL ENCOUNTER (OUTPATIENT)
Dept: INFUSION THERAPY | Age: 75
Discharge: HOME | End: 2020-07-09
Payer: MEDICARE

## 2020-07-09 LAB
ALBUMIN SERPL-MCNC: 4.3 GM/DL (ref 3.4–5)
ALP BLD-CCNC: 98 IU/L (ref 40–128)
ALT SERPL-CCNC: 14 U/L (ref 10–40)
ANION GAP SERPL CALCULATED.3IONS-SCNC: 12 MMOL/L (ref 4–16)
AST SERPL-CCNC: 24 IU/L (ref 15–37)
BASOPHILS ABSOLUTE: 0 K/CU MM
BASOPHILS RELATIVE PERCENT: 0.3 % (ref 0–1)
BILIRUB SERPL-MCNC: 0.9 MG/DL (ref 0–1)
BUN BLDV-MCNC: 21 MG/DL (ref 6–23)
CALCIUM SERPL-MCNC: 9.9 MG/DL (ref 8.3–10.6)
CHLORIDE BLD-SCNC: 106 MMOL/L (ref 99–110)
CO2: 23 MMOL/L (ref 21–32)
CREAT SERPL-MCNC: 1.5 MG/DL (ref 0.6–1.1)
DIFFERENTIAL TYPE: ABNORMAL
EOSINOPHILS ABSOLUTE: 0.2 K/CU MM
EOSINOPHILS RELATIVE PERCENT: 2.8 % (ref 0–3)
GFR AFRICAN AMERICAN: 41 ML/MIN/1.73M2
GFR NON-AFRICAN AMERICAN: 34 ML/MIN/1.73M2
GLUCOSE BLD-MCNC: 82 MG/DL (ref 70–99)
HCT VFR BLD CALC: 40.9 % (ref 37–47)
HEMOGLOBIN: 13.9 GM/DL (ref 12.5–16)
LYMPHOCYTES ABSOLUTE: 1.9 K/CU MM
LYMPHOCYTES RELATIVE PERCENT: 28.6 % (ref 24–44)
MCH RBC QN AUTO: 27.5 PG (ref 27–31)
MCHC RBC AUTO-ENTMCNC: 34 % (ref 32–36)
MCV RBC AUTO: 81 FL (ref 78–100)
MONOCYTES ABSOLUTE: 0.6 K/CU MM
MONOCYTES RELATIVE PERCENT: 9 % (ref 0–4)
PDW BLD-RTO: 17.5 % (ref 11.7–14.9)
PLATELET # BLD: 187 K/CU MM (ref 140–440)
PMV BLD AUTO: 11.3 FL (ref 7.5–11.1)
POTASSIUM SERPL-SCNC: 4.4 MMOL/L (ref 3.5–5.1)
RBC # BLD: 5.05 M/CU MM (ref 4.2–5.4)
SEGMENTED NEUTROPHILS ABSOLUTE COUNT: 3.8 K/CU MM
SEGMENTED NEUTROPHILS RELATIVE PERCENT: 59.3 % (ref 36–66)
SODIUM BLD-SCNC: 141 MMOL/L (ref 135–145)
TOTAL PROTEIN: 7.5 GM/DL (ref 6.4–8.2)
WBC # BLD: 6.5 K/CU MM (ref 4–10.5)

## 2020-07-09 PROCEDURE — 99211 OFF/OP EST MAY X REQ PHY/QHP: CPT

## 2020-07-09 PROCEDURE — 85025 COMPLETE CBC W/AUTO DIFF WBC: CPT

## 2020-07-09 PROCEDURE — 86300 IMMUNOASSAY TUMOR CA 15-3: CPT

## 2020-07-09 PROCEDURE — 36415 COLL VENOUS BLD VENIPUNCTURE: CPT

## 2020-07-09 PROCEDURE — 80053 COMPREHEN METABOLIC PANEL: CPT

## 2020-07-11 LAB — CA 27.29: 26.1 U/ML (ref 0–40)

## 2020-10-01 RX ORDER — GABAPENTIN 600 MG/1
600 TABLET ORAL 3 TIMES DAILY
Qty: 90 TABLET | Refills: 1 | Status: SHIPPED | OUTPATIENT
Start: 2020-10-01 | End: 2020-12-29

## 2020-10-01 NOTE — TELEPHONE ENCOUNTER
Patient called and lvm that she is needing a refill of the gabapentin 600 mg one 3 times per day to [facility] on [address]

## 2020-10-22 ENCOUNTER — HOSPITAL ENCOUNTER (OUTPATIENT)
Dept: INFUSION THERAPY | Age: 75
Discharge: HOME | End: 2020-10-22
Payer: MEDICARE

## 2020-10-22 ENCOUNTER — OFFICE VISIT (OUTPATIENT)
Dept: ONCOLOGY | Age: 75
End: 2020-10-22
Payer: MEDICARE

## 2020-10-22 VITALS
HEART RATE: 72 BPM | BODY MASS INDEX: 32.25 KG/M2 | SYSTOLIC BLOOD PRESSURE: 148 MMHG | OXYGEN SATURATION: 97 % | WEIGHT: 182 LBS | TEMPERATURE: 97.5 F | HEIGHT: 63 IN | DIASTOLIC BLOOD PRESSURE: 75 MMHG

## 2020-10-22 PROCEDURE — 1123F ACP DISCUSS/DSCN MKR DOCD: CPT | Performed by: INTERNAL MEDICINE

## 2020-10-22 PROCEDURE — 99211 OFF/OP EST MAY X REQ PHY/QHP: CPT

## 2020-10-22 PROCEDURE — G8484 FLU IMMUNIZE NO ADMIN: HCPCS | Performed by: INTERNAL MEDICINE

## 2020-10-22 PROCEDURE — G8400 PT W/DXA NO RESULTS DOC: HCPCS | Performed by: INTERNAL MEDICINE

## 2020-10-22 PROCEDURE — G8417 CALC BMI ABV UP PARAM F/U: HCPCS | Performed by: INTERNAL MEDICINE

## 2020-10-22 PROCEDURE — 1090F PRES/ABSN URINE INCON ASSESS: CPT | Performed by: INTERNAL MEDICINE

## 2020-10-22 PROCEDURE — G8427 DOCREV CUR MEDS BY ELIG CLIN: HCPCS | Performed by: INTERNAL MEDICINE

## 2020-10-22 PROCEDURE — 99214 OFFICE O/P EST MOD 30 MIN: CPT | Performed by: INTERNAL MEDICINE

## 2020-10-22 PROCEDURE — 4040F PNEUMOC VAC/ADMIN/RCVD: CPT | Performed by: INTERNAL MEDICINE

## 2020-10-22 PROCEDURE — 1036F TOBACCO NON-USER: CPT | Performed by: INTERNAL MEDICINE

## 2020-10-22 ASSESSMENT — PATIENT HEALTH QUESTIONNAIRE - PHQ9
SUM OF ALL RESPONSES TO PHQ QUESTIONS 1-9: 0
SUM OF ALL RESPONSES TO PHQ QUESTIONS 1-9: 0
SUM OF ALL RESPONSES TO PHQ9 QUESTIONS 1 & 2: 0
1. LITTLE INTEREST OR PLEASURE IN DOING THINGS: 0
SUM OF ALL RESPONSES TO PHQ QUESTIONS 1-9: 0
2. FEELING DOWN, DEPRESSED OR HOPELESS: 0

## 2020-10-22 NOTE — PROGRESS NOTES
MA Rooming Questions  Patient: Nenita Luu  MRN: [id number]    Date: 10/22/2020        1. Do you have any new issues? yes - hands still bothering her, patient states she got the flu shot about a month ago and injection site is still sore. 2. Do you need any refills on medications?    no    3. Have you had any imaging done since your last visit?   no    4. Have you been hospitalized or seen in the emergency room since your last visit here?   no    5. Did the patient have a depression screening completed today?  Yes    PHQ-9 Total Score: 0 (10/22/2020  3:07 PM)       PHQ-9 Given to (if applicable):               PHQ-9 Score (if applicable):                     [] Positive     []  Negative              Does question #9 need addressed (if applicable)                     [] Yes    []  No               Venessa Bronson

## 2020-10-22 NOTE — PROGRESS NOTES
Patient Name: Deng Glasgow  Patient : 1945  Patient MRN: [id number]     Primary Oncologist: Han Bhat MD  Referring Physician: SUREHS [address]       Date of Service: 10/22/2020      Chief Complaint:   Chief Complaint   Patient presents with    Follow-up        Active Problem list  1. Malignant neoplasm of upper-outer quadrant of left female breast, unspecified estrogen receptor status ([facility] [address]    Primary malignant neoplasm of female breast (disorder) ( Stage Date: 2017, Stage IIB (Primary, Left breast upper-outer quadrant, T2, pN1a, M0)  Date of Dx:2017 Disease State: Stable disease; Histopathologic Type: Ductal invasive carcinoma; Histologic Grade-Michelle: G3; ER Status: Negative; FL Status: Negative; Androgen Receptor Status: Unknown; HER-2/michi Value-FISH: Negative; Menopausal Status: Postmenopausal; First record:2017 Last record:2017 Other Migrated ICD10: C50.412 )         HPI:   Dr Saroj Uriarte Notes:  ------------------------       Ms. Espinoza underwent routine mammogram on 2017, and was noted to have new asymmetry involving the left breast at 2 o'clock position. Additional studies were recommended including ultrasound on 2017, showing highly suspicious left breast mass, correlating on mammographic area, also abnormal left axillary lymph node was suspicious for metastatic disease. 2017, she did undergo ultrasound-guided biopsy of the left breast mass as well as the left axillary mass. Biopsy from the left breast revealing infiltrating ductal carcinoma. Preliminary biopsy from the lymph node was read as possible metastatic disease, but final pathology was negative for disease involving axillary node. The breast lesion was infiltrating ductal, grade 2, negative for presence of DCIS, LCIS, or lymphovascular invasion. ER, FL, HER2 were negative. Chest x-ray 2017, was negative. She saw me on 2017.  Prior to that, she had been scheduled to undergo surgery per Dr. Galilea Brush on July 25, 2017. On July 21, we did talk about the role for neoadjuvant chemotherapy, especially with triple-negative disease. Her tumor was felt to be less than 2 cm and it was felt that maybe the nodes were negative. She already was going to have surgery in the form of lumpectomy and axillary treasure sampling. She did undergo surgery on July 25, 2017, in the form of left lumpectomy and sentinel node biopsy, as well as placement of a port. She tolerated that procedure quite well. Pathology from the specimen revealed invasive ductal carcinoma, grade 3, 2.2 cm in greatest dimension, presence of ductal carcinoma in situ, and one out of two nodes being positive for metastatic disease, 9 mm in greatest dimension. Recovered well from surgery. CA 27-29 level on August 18, 2017, was 25.8. CT scan of the chest, abdomen, and pelvis August 21, 2017, was negative for any metastases. CT scan of the head too was negative for any metastasis. After receiving information about adjuvant chemotherapy treatments, she was also seen by Dr. Haleigh Shields on August 18, 2017, and decision was to proceed with chemotherapy treatments first and then go on to radiation therapy treatments. She was to start her first cycle of treatment on August 31, 2017, but we had trouble accessing the port and she was evaluated by IR and she was noted to have the tip of the Mediport projecting into the right internal jugular vein. The catheter demonstrated 360 degree loop. Subsequently, she saw Dr. Galilea Brush again and the port was inserted on the other side and she was able to start her treatment with Adriamycin and Cytoxan on September 7, 2017.  She did of course receive Kytril, Emend, olanzapine, and dexamethasone as well as Neulasta  Saw me on November 27, 2017, had received her fourth cycle of treatment with A/C on November 9, 2017, with moderate symptoms of weakness and tiredness, some nausea, some epigastric discomfort, as well as hyperpigmentation, but overall she was still able to do quite q few of her day to day activities fairly well. Started on weekly Taxol on November 30, 2017, and finished on February 23, 2018. She tolerated these treatments with a moderate degree of morbidity, significant weakness, tiredness, hair loss, as well as skin hyperpigmentation. She subsequently did see Dr. Haleigh Shields and arrangements were made for her to start radiation therapy treatments in mid April 2018. She saw me on April 02, 2018, still was having symptoms of weakness, tiredness, and peripheral neuropathy. She also had seen Dr. Galilea Brush the morning of April 2, 2018, for removal of Mediport but had complained to him about significant swelling involving the left arm as well as neck area which had come up in a couple of days and Doppler done at his office had revealed changes of deep vein thrombosis. After removal of the port she came to see me in the office. April 2, 2018, she was started on treatment with Lovenox 60 milligrams q.12, decreasing dose because of mild elevation of creatinine. The plan was to possibly treat her for 6 to 12 weeks and hold off further Lovenox and maybe keep her on aspirin. DVT was felt to be provoked. She was admitted to the hospital on April 8, 2018, and discharged on April 9, 2018. She was admitted with symptoms of cellulitis in the right leg as well as possible acute gout. Ultrasound of the right leg was negative for any deep vein thrombosis and CT scan of the abdomen and pelvis was negative for any distinct masses. She was treated with steroids and antibiotics and discharged. When seen in the office on April 10, 2018, she was doing quite well, was doing much better as far as the discomfort involving the leg. She stated that she still had symptoms of neuropathy but did not feel that she needed to take gabapentin.  She also complained of Lovenox being too expensive and wanted to switch over to Xarelto. Visit here on May 10, 2018, was being treated with Xarelto 20 milligrams daily, tolerating it quite well. The swelling in the leg had improved and she had not noticed any bleeding anywhere. She was undergoing radiation therapy treatments and so far had been tolerating it fairly well. She was seen in the office on July 10, 2018, had finished receiving radiation therapy treatments with mild degree of mobility, had recovered fairly well. Major issue still was symptoms of peripheral neuropathy poorly controlled with gabapentin 600 mg twice a day. If it was not for neuropathy she in general would be feeling quite well. She was finished receiving treatments with Lovenox. After finishing above-mentioned treatments she did see Presley Vianey on August 15, 2018 for survivorship visit. She was seen in the office on September 11, 2018 was doing quite well stated had a mammogram per Dr. Micheline Lam late July 2018 and that was unremarkable. She did undergo bone scan per Dr. Micheline Lam on January 23, 2019 for muscle and joint discomfort study was unremarkable. She was seen in the office on March 12, 2019. Still was having symptoms of peripheral neuropathy was taking gabapentin 600 mg twice a day did encourage her to take maybe 3 times a day. July 18, 2019 did spend time with her. Overall she was doing well some symptoms of neuropathy. Also a few weeks history of dysphagia. Exam was fairly unremarkable. Blood work and tumor markers were drawn and advised her to have CT scans of the neck as well as chest as part of work-up for hoarseness of voice. She was also advised to see Dr. Jessica Arellano for the same. CT scan of the neck done on July 25, 2019 was basically negative for any distinct mass. CT scan of the chest done on July 25, 2019 2 was negative for any significant adenopathy. There was slight changes of cardiomegaly with minor atherosclerotic changes.  There was a 7 mm noncalcified right middle lobe pulmonary nodule etiology of that was unclear and repeat study in few months was recommended. CT scan of the chest done on November 7, 2019 revealed stable 8 mm noncalcified right middle lobe subpleural nodule compared to the CT scan done on July 25, 2019. Still somewhat concerning as it was felt to be new since earlier CAT scan in 2018. CT scan of the chest done on March 5, 2020 again revealed stable 8 mm right middle lobe pulmonary nodule continued surveillance was recommended. July 9, 2020 she was seen in the office in general was doing quite well still symptoms of neuropathy gabapentin still partially helpful otherwise in decent spirits managing her affairs enjoying her family and grandchildren. Still was being followed by her primary care physician as well as Dr. Sheri Shaw getting mammograms through them. Past medical problems include hypertension. Past surgical history includes tubal ligation and cataract involving both eyes. Dr Timur Pichardo Notes:  -----------------------  8/18/20: Mimi Toure with no suspicious lesions in either breast. BIRADS cat 2    Interval History  10/22/2020:Arrived alone to the clinic today. Reported that she has been staying safe, following all precautions but going out to dinner with friends. No breast masses, nipple discharge, erythema. No fever, cough. Appetite is good and denied any weight loss. No chest pain, increased sob, palpitations or  Any dizziness. Reported that she received flu vaccine in the right deltoid a month ago but still sore especially when she lifts her arm. Reported neuropathy is stable, gabapentin helps somewhat. Drops things at times. No abdominal pain. No nausea, emesis, diarrhea or any constipation. No  sx. No LE edema bilaterally. No HA, vision changes or any focal weakness.      Review of Systems   Per interval history; otherwise 10 point ROS is negative              Vital Signs:  BP (!) 148/75 (Site: Right Upper Arm, Position: Sitting, Cuff Size: Large Adult)   Pulse 72   Temp 97.5 °F (36.4 °C) (Temporal)   Ht 5' 3\" (1.6 m)   Wt 182 lb (82.6 kg)   SpO2 97%   BMI 32.24 kg/m²     Physical Exam:    CONSTITUTIONAL: aao x 3, no distress, overweight/obese   EYES: No palor or any icetrus  ENT:ATNC  NECK:no JVD  HEMATOLOGIC/LYMPHATIC: no cervical, supraclavicular or axillary lymphadenopathy   LUNGS: CTAB  CARDIOVASCULAR: s1s2 rrr  ABDOMEN: soft ntnd bs pos no hsm  NEUROLOGIC:GI  SKIN: No rash  EXTREMITIES: no LE edema bilaterally  Breast: left lumpectomy scar, mild skin radiation changes    Labs:    Hematology:  Lab Results   Component Value Date    WBC 6.5 07/09/2020    RBC 5.05 07/09/2020    HGB 13.9 07/09/2020    HCT 40.9 07/09/2020    MCV 81.0 07/09/2020    MCH 27.5 07/09/2020    MCHC 34.0 07/09/2020    RDW 17.5 (H) 07/09/2020     07/09/2020    MPV 11.3 (H) 07/09/2020    SEGSPCT 59.3 07/09/2020    EOSRELPCT 2.8 07/09/2020    BASOPCT 0.3 07/09/2020    LYMPHOPCT 28.6 07/09/2020    MONOPCT 9.0 (H) 07/09/2020    SEGSABS 3.8 07/09/2020    EOSABS 0.2 07/09/2020    BASOSABS 0.0 07/09/2020    LYMPHSABS 1.9 07/09/2020    MONOSABS 0.6 07/09/2020    DIFFTYPE AUTOMATED DIFFERENTIAL 07/09/2020     Lab Results   Component Value Date    ESR 33 (H) 04/08/2018       Chemistry:  Lab Results   Component Value Date     07/09/2020    K 4.4 07/09/2020     07/09/2020    CO2 23 07/09/2020    BUN 21 07/09/2020    CREATININE 1.5 (H) 07/09/2020    GLUCOSE 82 07/09/2020    CALCIUM 9.9 07/09/2020    PROT 7.5 07/09/2020    LABALBU 4.3 07/09/2020    BILITOT 0.9 07/09/2020    ALKPHOS 98 07/09/2020    AST 24 07/09/2020    ALT 14 07/09/2020    LABGLOM 34 (L) 07/09/2020    GFRAA 41 (L) 07/09/2020     No results found for: MMA, LDH, HOMOCYSTEINE  No components found for: LD  No results found for: TSHHS, T4FREE, FT3    Immunology:  Lab Results   Component Value Date    PROT 7.5 07/09/2020     No results found for: David Up, 2017, changes of new architectural distortion in the left breast. That led to ultrasound and finally biopsy. Lesion was read to be 1.8 cm. There was an enlarged node in the left axillary area, but the biopsy from the axillary node was negative. Biopsy from the left breast revealed invasive ductal carcinoma, negative for presence of DCIS, LCIS, or lymphovascular invasion. Tumor was negative for ER, [place], and HER-2. Initial chest x-ray was negative. Denied any bony pains at that time. CBC and chemistries apparently were unremarkable, creatinine being 1.4. July 25, 2017, did undergo lumpectomy and sentinel node biopsy. One out of two sentinel nodes were positive. No additional nodes were removed. The lumpectomy specimen reveals primary tumor to be 2.2 cm, high-grade, negative margins. August 18, 2017, we did talk about definite need for adjuvant chemotherapy followed by radiation therapy treatments. She did see Dr. Aaron Mcgill the same day. She did undergo CT scan of the chest, abdomen, and pelvis as well as head on August 21, 2017, and those were negative. August 31, 2017, she was to start adjuvant therapy but we had trouble with the port that had to be repositioned by Dr. Nanci Nettles on the other side. She did start A/C on September 7, 2017, received her fourth cycle on November 9, 2017. She tolerated those treatments with moderate degree of morbidity, but overall did fairly well. Had some issue with nausea, irritability, acid reflux, and skin discoloration. January 2, 2018, in general was doing fair, overall tolerating these treatments fairly well, some symptoms of weakness and tiredness. A little bit of peripheral numbness and tingling. Issue with acid reflux, was going to try Prilosec for the same. Also had issue with fresh red blood per rectum, was evaluated by Dr. Jasmeet Shahid and Dr. Vern Escalera too. February 27, 2018: Started on weekly Taxol on November 30, 2017, finishing on February 23, 2018.  She 2018.    July 18, 2019 did spend time with her. Overall she was doing well some symptoms of neuropathy. Also a few weeks history of dysphagia. Exam was fairly unremarkable. Blood work and tumor markers were drawn and advised her to have CT scans of the neck as well as chest as part of work-up for hoarseness of voice. She was also advised to see Dr. Lydia Roblero for the same. CT scan of the neck done on July 25, 2019 was basically negative for any distinct mass. CT scan of the chest done on July 25, 2019 2 was negative for any significant adenopathy. There was slight changes of cardiomegaly with minor atherosclerotic changes. There was a 7 mm noncalcified right middle lobe pulmonary nodule etiology of that was unclear and repeat study in few months was recommended. July 9, 2020 overall she was doing quite well. Still some symptoms of neuropathy from earlier treatment but clinically no signs of recurrence. Regarding carcinoma the breast her plan being continuing to follow her progress through exams and mammograms. She has been getting her mammograms per her PCP as well as Dr. Reid Keyanna her gynecologist. Other issue has been a tiny 8 mm nodule in noted on the CT scan of the chest that had been stable but we will recheck her CAT scan again in 4 to 6months. We also been monitoring her tumor markers specially with aggressive nature of the disease. I did advise her that on her next visit it quite likely would be 1 of my partners who would be seeing her      Dr Jessica Valencia Notes:  ------------------------  Left Breast cancer: Diagnosed in 8/18/2017 stage IIb, T2 N1 M0. Triple negative. Underwent lumpectomy followed by chemotherapy with Adriamycin, Cytoxan followed by Taxol. Completed adjuvant [place] [address] was normal.  August 2020 mammogram was normal.  We will continue to follow-up NCCN guidelines. CKD: Creatinine 1.5 in July 2020. Avoid nephrotoxic medications and adequate hydration.     Lung nodule: Repeat CT chest scan ordered for February 2021. Continue the medical care. Discussed above findings and plan with her and she verbalized understanding. Answered all her questions. Discussed healthy lifestyle including weight loss, healthy diet and regular exercise as tolerated. Also discussed importance of being up-to-date with age-appropriate screening tools.     Recommend follow-up with primary care physician and other specialist.    Return to clinic in February 2021 or earlier as needed    [address]       Electronically signed by Jose Perez MD on 10/22/2020 at 3:10 PM

## 2020-12-29 RX ORDER — GABAPENTIN 600 MG/1
TABLET ORAL
Qty: 90 TABLET | Refills: 0 | Status: SHIPPED | OUTPATIENT
Start: 2020-12-29 | End: 2020-12-31 | Stop reason: SDUPTHER

## 2020-12-31 RX ORDER — GABAPENTIN 600 MG/1
TABLET ORAL
Qty: 90 TABLET | Refills: 0 | Status: SHIPPED | OUTPATIENT
Start: 2020-12-31 | End: 2021-03-30

## 2021-02-03 ENCOUNTER — HOSPITAL ENCOUNTER (OUTPATIENT)
Age: 76
Setting detail: SPECIMEN
Discharge: HOME | End: 2021-02-03
Payer: MEDICARE

## 2021-02-03 PROCEDURE — 87070 CULTURE OTHR SPECIMN AEROBIC: CPT

## 2021-02-07 LAB
CULTURE: NORMAL
Lab: NORMAL
SPECIMEN: NORMAL

## 2021-02-15 ENCOUNTER — HOSPITAL ENCOUNTER (OUTPATIENT)
Dept: CT IMAGING | Age: 76
Discharge: HOME | End: 2021-02-15
Payer: MEDICARE

## 2021-02-15 DIAGNOSIS — R91.1 LUNG NODULE: ICD-10-CM

## 2021-02-15 DIAGNOSIS — C50.412 MALIGNANT NEOPLASM OF UPPER-OUTER QUADRANT OF LEFT FEMALE BREAST, UNSPECIFIED ESTROGEN RECEPTOR STATUS (HCC): ICD-10-CM

## 2021-02-15 PROCEDURE — 71250 CT THORAX DX C-: CPT

## 2021-02-22 ENCOUNTER — OFFICE VISIT (OUTPATIENT)
Dept: ONCOLOGY | Age: 76
End: 2021-02-22
Payer: MEDICARE

## 2021-02-22 ENCOUNTER — HOSPITAL ENCOUNTER (OUTPATIENT)
Dept: INFUSION THERAPY | Age: 76
Discharge: HOME | End: 2021-02-22
Payer: MEDICARE

## 2021-02-22 VITALS
DIASTOLIC BLOOD PRESSURE: 76 MMHG | HEART RATE: 105 BPM | OXYGEN SATURATION: 97 % | HEIGHT: 63 IN | TEMPERATURE: 96.4 F | WEIGHT: 180.8 LBS | BODY MASS INDEX: 32.04 KG/M2 | RESPIRATION RATE: 18 BRPM | SYSTOLIC BLOOD PRESSURE: 141 MMHG

## 2021-02-22 DIAGNOSIS — C50.412 MALIGNANT NEOPLASM OF UPPER-OUTER QUADRANT OF LEFT FEMALE BREAST, UNSPECIFIED ESTROGEN RECEPTOR STATUS (HCC): ICD-10-CM

## 2021-02-22 DIAGNOSIS — C50.412 MALIGNANT NEOPLASM OF UPPER-OUTER QUADRANT OF LEFT FEMALE BREAST, UNSPECIFIED ESTROGEN RECEPTOR STATUS (HCC): Primary | ICD-10-CM

## 2021-02-22 DIAGNOSIS — R91.1 NODULE OF RIGHT LUNG: ICD-10-CM

## 2021-02-22 DIAGNOSIS — R91.1 LUNG NODULE: ICD-10-CM

## 2021-02-22 DIAGNOSIS — Z12.31 SCREENING MAMMOGRAM, ENCOUNTER FOR: ICD-10-CM

## 2021-02-22 LAB
ALBUMIN SERPL-MCNC: 3.8 GM/DL (ref 3.4–5)
ALP BLD-CCNC: 90 IU/L (ref 40–128)
ALT SERPL-CCNC: 10 U/L (ref 10–40)
ANION GAP SERPL CALCULATED.3IONS-SCNC: 10 MMOL/L (ref 4–16)
AST SERPL-CCNC: 17 IU/L (ref 15–37)
BASOPHILS ABSOLUTE: 0 K/CU MM
BASOPHILS RELATIVE PERCENT: 0.1 % (ref 0–1)
BILIRUB SERPL-MCNC: 0.5 MG/DL (ref 0–1)
BUN BLDV-MCNC: 26 MG/DL (ref 6–23)
CALCIUM SERPL-MCNC: 9.4 MG/DL (ref 8.3–10.6)
CHLORIDE BLD-SCNC: 107 MMOL/L (ref 99–110)
CO2: 26 MMOL/L (ref 21–32)
CREAT SERPL-MCNC: 1.6 MG/DL (ref 0.6–1.1)
DIFFERENTIAL TYPE: ABNORMAL
EOSINOPHILS ABSOLUTE: 0.1 K/CU MM
EOSINOPHILS RELATIVE PERCENT: 1.4 % (ref 0–3)
GFR AFRICAN AMERICAN: 38 ML/MIN/1.73M2
GFR NON-AFRICAN AMERICAN: 31 ML/MIN/1.73M2
GLUCOSE BLD-MCNC: 77 MG/DL (ref 70–99)
HCT VFR BLD CALC: 37.3 % (ref 37–47)
HEMOGLOBIN: 12.5 GM/DL (ref 12.5–16)
LYMPHOCYTES ABSOLUTE: 2.3 K/CU MM
LYMPHOCYTES RELATIVE PERCENT: 28.2 % (ref 24–44)
MCH RBC QN AUTO: 26.8 PG (ref 27–31)
MCHC RBC AUTO-ENTMCNC: 33.5 % (ref 32–36)
MCV RBC AUTO: 80 FL (ref 78–100)
MONOCYTES ABSOLUTE: 1 K/CU MM
MONOCYTES RELATIVE PERCENT: 12.8 % (ref 0–4)
PDW BLD-RTO: 17.8 % (ref 11.7–14.9)
PLATELET # BLD: 268 K/CU MM (ref 140–440)
PMV BLD AUTO: 10.4 FL (ref 7.5–11.1)
POTASSIUM SERPL-SCNC: 4.3 MMOL/L (ref 3.5–5.1)
RBC # BLD: 4.66 M/CU MM (ref 4.2–5.4)
SEGMENTED NEUTROPHILS ABSOLUTE COUNT: 4.6 K/CU MM
SEGMENTED NEUTROPHILS RELATIVE PERCENT: 57.5 % (ref 36–66)
SODIUM BLD-SCNC: 143 MMOL/L (ref 135–145)
TOTAL PROTEIN: 6.7 GM/DL (ref 6.4–8.2)
WBC # BLD: 8.1 K/CU MM (ref 4–10.5)

## 2021-02-22 PROCEDURE — 80053 COMPREHEN METABOLIC PANEL: CPT

## 2021-02-22 PROCEDURE — G8417 CALC BMI ABV UP PARAM F/U: HCPCS | Performed by: INTERNAL MEDICINE

## 2021-02-22 PROCEDURE — G8427 DOCREV CUR MEDS BY ELIG CLIN: HCPCS | Performed by: INTERNAL MEDICINE

## 2021-02-22 PROCEDURE — G8484 FLU IMMUNIZE NO ADMIN: HCPCS | Performed by: INTERNAL MEDICINE

## 2021-02-22 PROCEDURE — 36415 COLL VENOUS BLD VENIPUNCTURE: CPT

## 2021-02-22 PROCEDURE — 4040F PNEUMOC VAC/ADMIN/RCVD: CPT | Performed by: INTERNAL MEDICINE

## 2021-02-22 PROCEDURE — 86300 IMMUNOASSAY TUMOR CA 15-3: CPT

## 2021-02-22 PROCEDURE — 99214 OFFICE O/P EST MOD 30 MIN: CPT | Performed by: INTERNAL MEDICINE

## 2021-02-22 PROCEDURE — G8400 PT W/DXA NO RESULTS DOC: HCPCS | Performed by: INTERNAL MEDICINE

## 2021-02-22 PROCEDURE — 85025 COMPLETE CBC W/AUTO DIFF WBC: CPT

## 2021-02-22 PROCEDURE — 1036F TOBACCO NON-USER: CPT | Performed by: INTERNAL MEDICINE

## 2021-02-22 PROCEDURE — 1123F ACP DISCUSS/DSCN MKR DOCD: CPT | Performed by: INTERNAL MEDICINE

## 2021-02-22 PROCEDURE — 99211 OFF/OP EST MAY X REQ PHY/QHP: CPT

## 2021-02-22 PROCEDURE — 1090F PRES/ABSN URINE INCON ASSESS: CPT | Performed by: INTERNAL MEDICINE

## 2021-02-22 NOTE — PROGRESS NOTES
Patient Name: Nichole Mirza  Patient : 1945  Patient MRN: [id number]     Primary Oncologist: Tereso Montes MD  Referring Physician: SURESH Saunders [address]       Date of Service: 2021      Chief Complaint:   Chief Complaint   Patient presents with    Follow-up        Active Problem list  1. Malignant neoplasm of upper-outer quadrant of left female breast, unspecified estrogen receptor status ([facility] [address]    2. Screening mammogram, encounter for    3. Nodule of right lung    Primary malignant neoplasm of female breast (disorder) ( Stage Date: 2017, Stage IIB (Primary, Left breast upper-outer quadrant, T2, pN1a, M0)  Date of Dx:2017 Disease State: Stable disease; Histopathologic Type: Ductal invasive carcinoma; Histologic Grade-Michelle: G3; ER Status: Negative; IN Status: Negative; Androgen Receptor Status: Unknown; HER-2/michi Value-FISH: Negative; Menopausal Status: Postmenopausal; First record:2017 Last record:2017 Other Migrated ICD10: C50.412 )         HPI:   Dr Tamanna Jackson Notes:  ------------------------       Ms. Espinoza underwent routine mammogram on 2017, and was noted to have new asymmetry involving the left breast at 2 o'clock position. Additional studies were recommended including ultrasound on 2017, showing highly suspicious left breast mass, correlating on mammographic area, also abnormal left axillary lymph node was suspicious for metastatic disease. 2017, she did undergo ultrasound-guided biopsy of the left breast mass as well as the left axillary mass. Biopsy from the left breast revealing infiltrating ductal carcinoma. Preliminary biopsy from the lymph node was read as possible metastatic disease, but final pathology was negative for disease involving axillary node. The breast lesion was infiltrating ductal, grade 2, negative for presence of DCIS, LCIS, or lymphovascular invasion. ER, IN, HER2 were negative.    Chest x-ray July 7, 2017, was negative. She saw me on July 21, 2017. Prior to that, she had been scheduled to undergo surgery per Dr. Mendoza Meadows on July 25, 2017. On July 21, we did talk about the role for neoadjuvant chemotherapy, especially with triple-negative disease. Her tumor was felt to be less than 2 cm and it was felt that maybe the nodes were negative. She already was going to have surgery in the form of lumpectomy and axillary treasure sampling. She did undergo surgery on July 25, 2017, in the form of left lumpectomy and sentinel node biopsy, as well as placement of a port. She tolerated that procedure quite well. Pathology from the specimen revealed invasive ductal carcinoma, grade 3, 2.2 cm in greatest dimension, presence of ductal carcinoma in situ, and one out of two nodes being positive for metastatic disease, 9 mm in greatest dimension. Recovered well from surgery. CA 27-29 level on August 18, 2017, was 25.8. CT scan of the chest, abdomen, and pelvis August 21, 2017, was negative for any metastases. CT scan of the head too was negative for any metastasis. After receiving information about adjuvant chemotherapy treatments, she was also seen by Dr. Daren Noguera on August 18, 2017, and decision was to proceed with chemotherapy treatments first and then go on to radiation therapy treatments. She was to start her first cycle of treatment on August 31, 2017, but we had trouble accessing the port and she was evaluated by IR and she was noted to have the tip of the Mediport projecting into the right internal jugular vein. The catheter demonstrated 360 degree loop. Subsequently, she saw Dr. Mendoza Meadows again and the port was inserted on the other side and she was able to start her treatment with Adriamycin and Cytoxan on September 7, 2017.  She did of course receive Kytril, Emend, olanzapine, and dexamethasone as well as Neulasta  Saw me on November 27, 2017, had received her fourth cycle of treatment with that she needed to take gabapentin. She also complained of Lovenox being too expensive and wanted to switch over to Xarelto. Visit here on May 10, 2018, was being treated with Xarelto 20 milligrams daily, tolerating it quite well. The swelling in the leg had improved and she had not noticed any bleeding anywhere. She was undergoing radiation therapy treatments and so far had been tolerating it fairly well. She was seen in the office on July 10, 2018, had finished receiving radiation therapy treatments with mild degree of mobility, had recovered fairly well. Major issue still was symptoms of peripheral neuropathy poorly controlled with gabapentin 600 mg twice a day. If it was not for neuropathy she in general would be feeling quite well. She was finished receiving treatments with Lovenox. After finishing above-mentioned treatments she did see Osman Blanchard on August 15, 2018 for survivorship visit. She was seen in the office on September 11, 2018 was doing quite well stated had a mammogram per Dr. Valeria Marcos late July 2018 and that was unremarkable. She did undergo bone scan per Dr. Valeria Marcos on January 23, 2019 for muscle and joint discomfort study was unremarkable. She was seen in the office on March 12, 2019. Still was having symptoms of peripheral neuropathy was taking gabapentin 600 mg twice a day did encourage her to take maybe 3 times a day. July 18, 2019 did spend time with her. Overall she was doing well some symptoms of neuropathy. Also a few weeks history of dysphagia. Exam was fairly unremarkable. Blood work and tumor markers were drawn and advised her to have CT scans of the neck as well as chest as part of work-up for hoarseness of voice. She was also advised to see Dr. Arden Burrows for the same. CT scan of the neck done on July 25, 2019 was basically negative for any distinct mass. CT scan of the chest done on July 25, 2019 2 was negative for any significant adenopathy.  There was slight changes of cardiomegaly with minor atherosclerotic changes. There was a 7 mm noncalcified right middle lobe pulmonary nodule etiology of that was unclear and repeat study in few months was recommended. CT scan of the chest done on November 7, 2019 revealed stable 8 mm noncalcified right middle lobe subpleural nodule compared to the CT scan done on July 25, 2019. Still somewhat concerning as it was felt to be new since earlier CAT scan in 2018. CT scan of the chest done on March 5, 2020 again revealed stable 8 mm right middle lobe pulmonary nodule continued surveillance was recommended. July 9, 2020 she was seen in the office in general was doing quite well still symptoms of neuropathy gabapentin still partially helpful otherwise in decent spirits managing her affairs enjoying her family and grandchildren. Still was being followed by her primary care physician as well as Dr. Loretta Faustin getting mammograms through them. Past medical problems include hypertension. Past surgical history includes tubal ligation and cataract involving both eyes. Dr Nahid Colon Notes:  -----------------------  8/18/20: Mammogram with no suspicious lesions in either breast. BIRADS cat 2    2/15/21; Ct chest:  1. Unchanged 0.8 cm solid nodule in the right middle lobe.  Stability is   reassuring, although absence on 04/08/2018 suggests the possibility of   primary or secondary malignancy.  Recommend continued attention on follow-up   imaging with consideration for PET/CT or percutaneous biopsy.  Of note, the   Fleischner Society recommendations for solid pulmonary nodules do not apply   in the setting of prior malignancy. 2. Changes of left partial mastectomy with no findings of disease recurrence. 3. Subtle new patchy peribronchovascular and subpleural groundglass near the   lung bases likely due to an infectious or inflammatory process, potentially   including atypical pneumonia.          Interval History  2/22/2021:Arrived alone to the clinic today. Overall feels fine. Due for COVID vaccine 2nd dose tomorrow. Left breast scar hurts intermittently. No breast masses, nipple discharge, erythema. No fever, cough. Appetite is good and denied any weight loss. No chest pain, increased sob, palpitations or  Any dizziness. Reported neuropathy is stable, gabapentin helps somewhat. Drops things at times. No abdominal pain. No nausea, emesis, diarrhea or any constipation. No  sx. No LE edema bilaterally. No HA, vision changes or any focal weakness. Review of Systems   Per interval history; otherwise 10 point ROS is negative              Vital Signs:  BP (!) 141/76 (Site: Right Upper Arm, Position: Sitting, Cuff Size: Large Adult)   Pulse 105   Temp 96.4 °F (35.8 °C) (Infrared)   Resp 18   Ht 5' 3\" (1.6 m)   Wt 180 lb 12.8 oz (82 kg)   SpO2 97%   BMI 32.03 kg/m²     Physical Exam:    CONSTITUTIONAL: aao x 3, no distress, overweight/obese   EYES: No palor or any icetrus  ENT:ATNC  NECK:no JVD  HEMATOLOGIC/LYMPHATIC: no cervical, supraclavicular or axillary lymphadenopathy   LUNGS: CTAB  CARDIOVASCULAR: s1s2 rrr  ABDOMEN: soft ntnd bs pos no hsm  NEUROLOGIC:GI  SKIN: No rash  EXTREMITIES: no LE edema bilaterally  Breast: left lumpectomy scar intact, mild skin radiation changes, no axillary lad .  Rt breast normal.     Labs:    Hematology:  Lab Results   Component Value Date    WBC 6.5 07/09/2020    RBC 5.05 07/09/2020    HGB 13.9 07/09/2020    HCT 40.9 07/09/2020    MCV 81.0 07/09/2020    MCH 27.5 07/09/2020    MCHC 34.0 07/09/2020    RDW 17.5 (H) 07/09/2020     07/09/2020    MPV 11.3 (H) 07/09/2020    SEGSPCT 59.3 07/09/2020    EOSRELPCT 2.8 07/09/2020    BASOPCT 0.3 07/09/2020    LYMPHOPCT 28.6 07/09/2020    MONOPCT 9.0 (H) 07/09/2020    SEGSABS 3.8 07/09/2020    EOSABS 0.2 07/09/2020    BASOSABS 0.0 07/09/2020    LYMPHSABS 1.9 07/09/2020    MONOSABS 0.6 07/09/2020    DIFFTYPE AUTOMATED DIFFERENTIAL 07/09/2020     Lab Results Component Value Date    ESR 33 (H) 04/08/2018       Chemistry:  Lab Results   Component Value Date     07/09/2020    K 4.4 07/09/2020     07/09/2020    CO2 23 07/09/2020    BUN 21 07/09/2020    CREATININE 1.5 (H) 07/09/2020    GLUCOSE 82 07/09/2020    CALCIUM 9.9 07/09/2020    PROT 7.5 07/09/2020    LABALBU 4.3 07/09/2020    BILITOT 0.9 07/09/2020    ALKPHOS 98 07/09/2020    AST 24 07/09/2020    ALT 14 07/09/2020    LABGLOM 34 (L) 07/09/2020    GFRAA 41 (L) 07/09/2020     No results found for: MMA, LDH, HOMOCYSTEINE  No components found for: LD  No results found for: TSHHS, T4FREE, FT3    Immunology:  Lab Results   Component Value Date    PROT 7.5 07/09/2020     No results found for: MALU Grajeda  No results found for: B2M    Coagulation Panel:  Lab Results   Component Value Date    PROTIME 12.2 04/08/2018    INR 1.05 04/08/2018       Anemia Panel:  No results found for: IGVDFNQM04, FOLATE    Tumor Markers:  Lab Results   Component Value Date     11 03/05/2020      03/05/2020     (NOTE)  INTERPRETIVE INFORMATION: Cancer Antigen 125  The Roche  electrochemiluminescent immunoassay was used. Results obtained with different test methods or kits cannot be   used interchangeably. The  test is used as an aid in   monitoring response to therapy for patients with epithelial   ovarian cancer. Serial testing for patient  values should be   used in conjunction with other clinical methods for monitoring   ovarian cancer. Patients with confirmed ovarian carcinoma may have   pretreatment  values in the same range as healthy   individuals. Elevations may be observed in patients with   nonmalignant disease. Therefore, a  value, regardless of   level, should not be interpreted as absolute evidence of the   presence or absence of malignant disease. Performed by [address], [address] [facility] [phone]  www. Roz Peacock MD, hospital was negative for deep vein thrombosis in the right leg. She also had CT of the abdomen and pelvis on April 8, 2018, and that too was negative for any distinct masses. On April, 10, 2018, we talked about continuing on anticoagulation for 6 to 12 weeks, after that removal of the port but as the Lovenox was quite expensive, we did switch over to Xarelto. May 10, 2018, she was doing quite well with 20 milligrams of Xarelto per day tolerating it quite well, no issue with bleeding. She was undergoing radiation therapy treatments per Dr. Alex Hogue too. Finished receiving radiation therapy treatments on June 5, 2018    Had a nice visit with Denisha Liang for survivorship on August 15, 2018. July 18, 2019 did spend time with her. Overall she was doing well some symptoms of neuropathy. Also a few weeks history of dysphagia. Exam was fairly unremarkable. Blood work and tumor markers were drawn and advised her to have CT scans of the neck as well as chest as part of work-up for hoarseness of voice. She was also advised to see Dr. Christy Cordero for the same. CT scan of the neck done on July 25, 2019 was basically negative for any distinct mass. CT scan of the chest done on July 25, 2019 2 was negative for any significant adenopathy. There was slight changes of cardiomegaly with minor atherosclerotic changes. There was a 7 mm noncalcified right middle lobe pulmonary nodule etiology of that was unclear and repeat study in few months was recommended. July 9, 2020 overall she was doing quite well. Still some symptoms of neuropathy from earlier treatment but clinically no signs of recurrence. Regarding carcinoma the breast her plan being continuing to follow her progress through exams and mammograms.  She has been getting her mammograms per her PCP as well as Dr. Indio Spear her gynecologist. Other issue has been a tiny 8 mm nodule in noted on the CT scan of the chest that had been stable but we will recheck her CAT scan again in 4 to 6months. We also been monitoring her tumor markers specially with aggressive nature of the disease. I did advise her that on her next visit it quite likely would be 1 of my partners who would be seeing her      Dr Milly Burton Notes:  ------------------------  Left Breast cancer: Diagnosed in 8/18/2017 stage IIb, T2 N1 M0. Triple negative. Underwent lumpectomy followed by chemotherapy with Adriamycin, Cytoxan followed by Taxol. Completed adjuvant [place] [address] was normal.  August 2020 mammogram was normal.  We will continue to follow-up NCCN guidelines. CKD: Creatinine 1.5 in July 2020. Avoid nephrotoxic medications and adequate hydration. Lung nodule: Ct chest feb 2021 with stable 0.8cm nodule. Discussed PET vs biopsy vs repeat Ct chest in 6M, she opts later. Continue the medical care. Discussed above findings and plan with her and she verbalized understanding. Answered all her questions. Discussed healthy lifestyle including weight loss, healthy diet and regular exercise as tolerated. Also discussed importance of being up-to-date with age-appropriate screening tools.     Recommend follow-up with primary care physician and other specialist.    Return to clinic in september 2021  or earlier as needed    [address]       Electronically signed by Kael Russell MD on 2/22/2021 at 3:43 PM

## 2021-02-22 NOTE — PROGRESS NOTES
MA Rooming Questions  Patient: Finn Mckinney  MRN: [id number]    Date: 2/22/2021        1. Do you have any new issues?   no         2. Do you need any refills on medications?    no    3. Have you had any imaging done since your last visit? yes - CT    4. Have you been hospitalized or seen in the emergency room since your last visit here?   no    5. Did the patient have a depression screening completed today?  No    No data recorded     PHQ-9 Given to (if applicable):               PHQ-9 Score (if applicable):                     [] Positive     []  Negative              Does question #9 need addressed (if applicable)                     [] Yes    []  No               Mo Hendricks MA

## 2021-03-01 LAB — CA 27.29: 30.5 U/ML (ref 0–40)

## 2021-03-24 PROBLEM — Z12.31 SCREENING MAMMOGRAM, ENCOUNTER FOR: Status: RESOLVED | Noted: 2021-02-22 | Resolved: 2021-03-24

## 2021-03-30 RX ORDER — GABAPENTIN 600 MG/1
TABLET ORAL
Qty: 90 TABLET | Refills: 0 | Status: SHIPPED | OUTPATIENT
Start: 2021-03-30 | End: 2021-05-10

## 2021-05-10 RX ORDER — GABAPENTIN 600 MG/1
TABLET ORAL
Qty: 90 TABLET | Refills: 3 | Status: SHIPPED | OUTPATIENT
Start: 2021-05-10 | End: 2021-05-12 | Stop reason: SDUPTHER

## 2021-05-12 RX ORDER — GABAPENTIN 600 MG/1
TABLET ORAL
Qty: 90 TABLET | Refills: 3 | Status: SHIPPED | OUTPATIENT
Start: 2021-05-12 | End: 2021-10-26

## 2021-07-28 ENCOUNTER — TELEPHONE (OUTPATIENT)
Dept: ONCOLOGY | Age: 76
End: 2021-07-28

## 2021-07-28 NOTE — TELEPHONE ENCOUNTER
Called patient regarding her CT scan on 08.27.2021 at [facility] arr 1400. I left a message regarding this appt, prep and direct number for questions.

## 2021-08-27 ENCOUNTER — HOSPITAL ENCOUNTER (OUTPATIENT)
Dept: CT IMAGING | Age: 76
Discharge: HOME | End: 2021-08-27
Payer: MEDICARE

## 2021-08-27 DIAGNOSIS — Z12.31 SCREENING MAMMOGRAM, ENCOUNTER FOR: ICD-10-CM

## 2021-08-27 DIAGNOSIS — C50.412 MALIGNANT NEOPLASM OF UPPER-OUTER QUADRANT OF LEFT FEMALE BREAST, UNSPECIFIED ESTROGEN RECEPTOR STATUS (HCC): ICD-10-CM

## 2021-08-27 DIAGNOSIS — R91.1 NODULE OF RIGHT LUNG: ICD-10-CM

## 2021-08-27 PROCEDURE — 71250 CT THORAX DX C-: CPT

## 2021-08-30 ENCOUNTER — HOSPITAL ENCOUNTER (OUTPATIENT)
Dept: INFUSION THERAPY | Age: 76
Discharge: HOME | End: 2021-08-30
Payer: MEDICARE

## 2021-08-30 ENCOUNTER — OFFICE VISIT (OUTPATIENT)
Dept: ONCOLOGY | Age: 76
End: 2021-08-30
Payer: MEDICARE

## 2021-08-30 VITALS
BODY MASS INDEX: 32 KG/M2 | SYSTOLIC BLOOD PRESSURE: 120 MMHG | OXYGEN SATURATION: 99 % | TEMPERATURE: 97 F | RESPIRATION RATE: 16 BRPM | HEIGHT: 63 IN | DIASTOLIC BLOOD PRESSURE: 60 MMHG | WEIGHT: 180.6 LBS | HEART RATE: 86 BPM

## 2021-08-30 DIAGNOSIS — R91.1 NODULE OF RIGHT LUNG: ICD-10-CM

## 2021-08-30 DIAGNOSIS — C50.412 MALIGNANT NEOPLASM OF UPPER-OUTER QUADRANT OF LEFT FEMALE BREAST, UNSPECIFIED ESTROGEN RECEPTOR STATUS (HCC): ICD-10-CM

## 2021-08-30 DIAGNOSIS — C50.412 MALIGNANT NEOPLASM OF UPPER-OUTER QUADRANT OF LEFT FEMALE BREAST, UNSPECIFIED ESTROGEN RECEPTOR STATUS (HCC): Primary | ICD-10-CM

## 2021-08-30 DIAGNOSIS — Z12.31 SCREENING MAMMOGRAM, ENCOUNTER FOR: ICD-10-CM

## 2021-08-30 LAB
ALBUMIN SERPL-MCNC: 3.9 GM/DL (ref 3.4–5)
ALP BLD-CCNC: 101 IU/L (ref 40–128)
ALT SERPL-CCNC: 11 U/L (ref 10–40)
ANION GAP SERPL CALCULATED.3IONS-SCNC: 13 MMOL/L (ref 4–16)
AST SERPL-CCNC: 17 IU/L (ref 15–37)
BASOPHILS ABSOLUTE: 0 K/CU MM
BASOPHILS RELATIVE PERCENT: 0.3 % (ref 0–1)
BILIRUB SERPL-MCNC: 0.6 MG/DL (ref 0–1)
BUN BLDV-MCNC: 25 MG/DL (ref 6–23)
CALCIUM SERPL-MCNC: 9.5 MG/DL (ref 8.3–10.6)
CHLORIDE BLD-SCNC: 109 MMOL/L (ref 99–110)
CO2: 24 MMOL/L (ref 21–32)
CREAT SERPL-MCNC: 1.7 MG/DL (ref 0.6–1.1)
DIFFERENTIAL TYPE: ABNORMAL
EOSINOPHILS ABSOLUTE: 0.2 K/CU MM
EOSINOPHILS RELATIVE PERCENT: 2.8 % (ref 0–3)
GFR AFRICAN AMERICAN: 35 ML/MIN/1.73M2
GFR NON-AFRICAN AMERICAN: 29 ML/MIN/1.73M2
GLUCOSE BLD-MCNC: 85 MG/DL (ref 70–99)
HCT VFR BLD CALC: 35.3 % (ref 37–47)
HEMOGLOBIN: 11.9 GM/DL (ref 12.5–16)
LYMPHOCYTES ABSOLUTE: 2.1 K/CU MM
LYMPHOCYTES RELATIVE PERCENT: 28.7 % (ref 24–44)
MCH RBC QN AUTO: 27 PG (ref 27–31)
MCHC RBC AUTO-ENTMCNC: 33.7 % (ref 32–36)
MCV RBC AUTO: 80.2 FL (ref 78–100)
MONOCYTES ABSOLUTE: 0.8 K/CU MM
MONOCYTES RELATIVE PERCENT: 11.1 % (ref 0–4)
PDW BLD-RTO: 18 % (ref 11.7–14.9)
PLATELET # BLD: 188 K/CU MM (ref 140–440)
PMV BLD AUTO: 10.7 FL (ref 7.5–11.1)
POTASSIUM SERPL-SCNC: 4 MMOL/L (ref 3.5–5.1)
RBC # BLD: 4.4 M/CU MM (ref 4.2–5.4)
SEGMENTED NEUTROPHILS ABSOLUTE COUNT: 4.1 K/CU MM
SEGMENTED NEUTROPHILS RELATIVE PERCENT: 57.1 % (ref 36–66)
SODIUM BLD-SCNC: 146 MMOL/L (ref 135–145)
TOTAL PROTEIN: 6.4 GM/DL (ref 6.4–8.2)
WBC # BLD: 7.2 K/CU MM (ref 4–10.5)

## 2021-08-30 PROCEDURE — G8427 DOCREV CUR MEDS BY ELIG CLIN: HCPCS | Performed by: INTERNAL MEDICINE

## 2021-08-30 PROCEDURE — 36415 COLL VENOUS BLD VENIPUNCTURE: CPT

## 2021-08-30 PROCEDURE — 1123F ACP DISCUSS/DSCN MKR DOCD: CPT | Performed by: INTERNAL MEDICINE

## 2021-08-30 PROCEDURE — 99211 OFF/OP EST MAY X REQ PHY/QHP: CPT

## 2021-08-30 PROCEDURE — 86300 IMMUNOASSAY TUMOR CA 15-3: CPT

## 2021-08-30 PROCEDURE — G8417 CALC BMI ABV UP PARAM F/U: HCPCS | Performed by: INTERNAL MEDICINE

## 2021-08-30 PROCEDURE — 99214 OFFICE O/P EST MOD 30 MIN: CPT | Performed by: INTERNAL MEDICINE

## 2021-08-30 PROCEDURE — 80053 COMPREHEN METABOLIC PANEL: CPT

## 2021-08-30 PROCEDURE — 1036F TOBACCO NON-USER: CPT | Performed by: INTERNAL MEDICINE

## 2021-08-30 PROCEDURE — G8400 PT W/DXA NO RESULTS DOC: HCPCS | Performed by: INTERNAL MEDICINE

## 2021-08-30 PROCEDURE — 1090F PRES/ABSN URINE INCON ASSESS: CPT | Performed by: INTERNAL MEDICINE

## 2021-08-30 PROCEDURE — 4040F PNEUMOC VAC/ADMIN/RCVD: CPT | Performed by: INTERNAL MEDICINE

## 2021-08-30 PROCEDURE — 85025 COMPLETE CBC W/AUTO DIFF WBC: CPT

## 2021-08-30 ASSESSMENT — PATIENT HEALTH QUESTIONNAIRE - PHQ9
SUM OF ALL RESPONSES TO PHQ9 QUESTIONS 1 & 2: 0
1. LITTLE INTEREST OR PLEASURE IN DOING THINGS: 0
SUM OF ALL RESPONSES TO PHQ QUESTIONS 1-9: 0
2. FEELING DOWN, DEPRESSED OR HOPELESS: 0
SUM OF ALL RESPONSES TO PHQ QUESTIONS 1-9: 0
SUM OF ALL RESPONSES TO PHQ QUESTIONS 1-9: 0

## 2021-08-30 NOTE — PROGRESS NOTES
MA Rooming Questions  Patient: Nanci Perry  MRN: [id number]    Date: 8/30/2021        1. Do you have any new issues?   no         2. Do you need any refills on medications?    no    3. Have you had any imaging done since your last visit?   no    4. Have you been hospitalized or seen in the emergency room since your last visit here?   no    5. Did the patient have a depression screening completed today?  Yes    PHQ-9 Total Score: 0 (8/30/2021  2:26 PM)       PHQ-9 Given to (if applicable):               PHQ-9 Score (if applicable):                     [] Positive     []  Negative              Does question #9 need addressed (if applicable)                     [] Yes    []  No               Danyel Vogel CMA

## 2021-08-30 NOTE — PROGRESS NOTES
Patient Name: Nanci Perry  Patient : 1945  Patient MRN: [id number]     Primary Oncologist: Nile Martinez MD  Referring Physician: SURESH Mina [address]       Date of Service: 2021      Chief Complaint:   Chief Complaint   Patient presents with    Discuss Labs        Active Problem list  No diagnosis found. Primary malignant neoplasm of female breast (disorder) ( Stage Date: 2017, Stage IIB (Primary, Left breast upper-outer quadrant, T2, pN1a, M0)  Date of Dx:2017 Disease State: Stable disease; Histopathologic Type: Ductal invasive carcinoma; Histologic Grade-Michelle: G3; ER Status: Negative; KY Status: Negative; Androgen Receptor Status: Unknown; HER-2/michi Value-FISH: Negative; Menopausal Status: Postmenopausal; First record:2017 Last record:2017 Other Migrated ICD10: C50.412 )         HPI:   Dr Ashtyn Coleman Notes:  ------------------------       Ms. Espinoza underwent routine mammogram on 2017, and was noted to have new asymmetry involving the left breast at 2 o'clock position. Additional studies were recommended including ultrasound on 2017, showing highly suspicious left breast mass, correlating on mammographic area, also abnormal left axillary lymph node was suspicious for metastatic disease. 2017, she did undergo ultrasound-guided biopsy of the left breast mass as well as the left axillary mass. Biopsy from the left breast revealing infiltrating ductal carcinoma. Preliminary biopsy from the lymph node was read as possible metastatic disease, but final pathology was negative for disease involving axillary node. The breast lesion was infiltrating ductal, grade 2, negative for presence of DCIS, LCIS, or lymphovascular invasion. ER, KY, HER2 were negative. Chest x-ray 2017, was negative. She saw me on 2017. Prior to that, she had been scheduled to undergo surgery per Dr. Jacob Streeter on 2017.  On  we did talk about the role for neoadjuvant chemotherapy, especially with triple-negative disease. Her tumor was felt to be less than 2 cm and it was felt that maybe the nodes were negative. She already was going to have surgery in the form of lumpectomy and axillary treasure sampling. She did undergo surgery on July 25, 2017, in the form of left lumpectomy and sentinel node biopsy, as well as placement of a port. She tolerated that procedure quite well. Pathology from the specimen revealed invasive ductal carcinoma, grade 3, 2.2 cm in greatest dimension, presence of ductal carcinoma in situ, and one out of two nodes being positive for metastatic disease, 9 mm in greatest dimension. Recovered well from surgery. CA 27-29 level on August 18, 2017, was 25.8. CT scan of the chest, abdomen, and pelvis August 21, 2017, was negative for any metastases. CT scan of the head too was negative for any metastasis. After receiving information about adjuvant chemotherapy treatments, she was also seen by Dr. Brianna Woodall on August 18, 2017, and decision was to proceed with chemotherapy treatments first and then go on to radiation therapy treatments. She was to start her first cycle of treatment on August 31, 2017, but we had trouble accessing the port and she was evaluated by IR and she was noted to have the tip of the Mediport projecting into the right internal jugular vein. The catheter demonstrated 360 degree loop. Subsequently, she saw Dr. Jacob Streeter again and the port was inserted on the other side and she was able to start her treatment with Adriamycin and Cytoxan on September 7, 2017.  She did of course receive Kytril, Emend, olanzapine, and dexamethasone as well as Neulasta  Saw me on November 27, 2017, had received her fourth cycle of treatment with A/C on November 9, 2017, with moderate symptoms of weakness and tiredness, some nausea, some epigastric discomfort, as well as hyperpigmentation, but overall she was still able to do quite q few of her day to day activities fairly well. Started on weekly Taxol on November 30, 2017, and finished on February 23, 2018. She tolerated these treatments with a moderate degree of morbidity, significant weakness, tiredness, hair loss, as well as skin hyperpigmentation. She subsequently did see Dr. Minoo Hough and arrangements were made for her to start radiation therapy treatments in mid April 2018. She saw me on April 02, 2018, still was having symptoms of weakness, tiredness, and peripheral neuropathy. She also had seen Dr. Eladia Russell the morning of April 2, 2018, for removal of Mediport but had complained to him about significant swelling involving the left arm as well as neck area which had come up in a couple of days and Doppler done at his office had revealed changes of deep vein thrombosis. After removal of the port she came to see me in the office. April 2, 2018, she was started on treatment with Lovenox 60 milligrams q.12, decreasing dose because of mild elevation of creatinine. The plan was to possibly treat her for 6 to 12 weeks and hold off further Lovenox and maybe keep her on aspirin. DVT was felt to be provoked. She was admitted to the hospital on April 8, 2018, and discharged on April 9, 2018. She was admitted with symptoms of cellulitis in the right leg as well as possible acute gout. Ultrasound of the right leg was negative for any deep vein thrombosis and CT scan of the abdomen and pelvis was negative for any distinct masses. She was treated with steroids and antibiotics and discharged. When seen in the office on April 10, 2018, she was doing quite well, was doing much better as far as the discomfort involving the leg. She stated that she still had symptoms of neuropathy but did not feel that she needed to take gabapentin. She also complained of Lovenox being too expensive and wanted to switch over to Xarelto.      Visit here on May 10, 2018, was being treated with Xarelto 20 milligrams daily, tolerating it quite well. The swelling in the leg had improved and she had not noticed any bleeding anywhere. She was undergoing radiation therapy treatments and so far had been tolerating it fairly well. She was seen in the office on July 10, 2018, had finished receiving radiation therapy treatments with mild degree of mobility, had recovered fairly well. Major issue still was symptoms of peripheral neuropathy poorly controlled with gabapentin 600 mg twice a day. If it was not for neuropathy she in general would be feeling quite well. She was finished receiving treatments with Lovenox. After finishing above-mentioned treatments she did see Hilario Pond on August 15, 2018 for survivorship visit. She was seen in the office on September 11, 2018 was doing quite well stated had a mammogram per Dr. Oneal Arellano late July 2018 and that was unremarkable. She did undergo bone scan per Dr. Oneal Arellano on January 23, 2019 for muscle and joint discomfort study was unremarkable. She was seen in the office on March 12, 2019. Still was having symptoms of peripheral neuropathy was taking gabapentin 600 mg twice a day did encourage her to take maybe 3 times a day. July 18, 2019 did spend time with her. Overall she was doing well some symptoms of neuropathy. Also a few weeks history of dysphagia. Exam was fairly unremarkable. Blood work and tumor markers were drawn and advised her to have CT scans of the neck as well as chest as part of work-up for hoarseness of voice. She was also advised to see Dr. Mer Leos for the same. CT scan of the neck done on July 25, 2019 was basically negative for any distinct mass. CT scan of the chest done on July 25, 2019 2 was negative for any significant adenopathy. There was slight changes of cardiomegaly with minor atherosclerotic changes.  There was a 7 mm noncalcified right middle lobe pulmonary nodule etiology of that was unclear and repeat study in few months was recommended. CT scan of the chest done on November 7, 2019 revealed stable 8 mm noncalcified right middle lobe subpleural nodule compared to the CT scan done on July 25, 2019. Still somewhat concerning as it was felt to be new since earlier CAT scan in 2018. CT scan of the chest done on March 5, 2020 again revealed stable 8 mm right middle lobe pulmonary nodule continued surveillance was recommended. July 9, 2020 she was seen in the office in general was doing quite well still symptoms of neuropathy gabapentin still partially helpful otherwise in decent spirits managing her affairs enjoying her family and grandchildren. Still was being followed by her primary care physician as well as Dr. Robin Peña getting mammograms through them. Past medical problems include hypertension. Past surgical history includes tubal ligation and cataract involving both eyes. Dr Ismael Rust Notes:  -----------------------  8/18/20: Mammogram with no suspicious lesions in either breast. BIRADS cat 2    2/15/21; Ct chest:  1. Unchanged 0.8 cm solid nodule in the right middle lobe.  Stability is   reassuring, although absence on 04/08/2018 suggests the possibility of   primary or secondary malignancy.  Recommend continued attention on follow-up   imaging with consideration for PET/CT or percutaneous biopsy.  Of note, the   Fleischner Society recommendations for solid pulmonary nodules do not apply   in the setting of prior malignancy. 2. Changes of left partial mastectomy with no findings of disease recurrence. 3. Subtle new patchy peribronchovascular and subpleural groundglass near the   lung bases likely due to an infectious or inflammatory process, potentially   including atypical pneumonia. 8/2021: Ct chest:  1. No acute intrathoracic abnormality. 2. 8 mm pleural based nodule in the right middle lobe is unchanged. Continued attention at follow-up is recommended.      August 2021 mammogram was done and she is unaware of the results    Interval History  8/30/2021:Arrived alone to the clinic today. Overall feels fine. Not been able to lift the right arm above the shoulder level at times. Left breast scar hurts intermittently. No breast masses, nipple discharge, erythema. No fever, cough. Appetite is good and denied any weight loss. No chest pain, increased sob, palpitations or  Any dizziness. Reported neuropathy is stable, gabapentin helps a little. No abdominal pain. No nausea, emesis, diarrhea or any constipation. No  sx. No LE edema bilaterally. No HA, vision changes or any focal weakness. Review of Systems   Per interval history; otherwise 10 point ROS is negative              Vital Signs:  /60 (Site: Right Upper Arm, Position: Sitting, Cuff Size: Large Adult)   Pulse 86   Temp 97 °F (36.1 °C) (Infrared)   Resp 16   Ht 5' 3\" (1.6 m)   Wt 180 lb 9.6 oz (81.9 kg)   SpO2 99%   BMI 31.99 kg/m²     Physical Exam:    CONSTITUTIONAL: aao x 3, no distress, overweight/obese   EYES: No palor or any icetrus  ENT:ATNC  NECK:no JVD  HEMATOLOGIC/LYMPHATIC: no cervical, supraclavicular or axillary lymphadenopathy   LUNGS: CTAB  CARDIOVASCULAR: s1s2 rrr  ABDOMEN: soft ntnd bs pos no hsm  NEUROLOGIC:GI  SKIN: No rash  EXTREMITIES: no LE edema bilaterally  Breast: left lumpectomy scar intact, mild skin radiation changes, no axillary lad .  Rt breast normal.     Labs:    Hematology:  Lab Results   Component Value Date    WBC 8.1 02/22/2021    RBC 4.66 02/22/2021    HGB 12.5 02/22/2021    HCT 37.3 02/22/2021    MCV 80.0 02/22/2021    MCH 26.8 (L) 02/22/2021    MCHC 33.5 02/22/2021    RDW 17.8 (H) 02/22/2021     02/22/2021    MPV 10.4 02/22/2021    SEGSPCT 57.5 02/22/2021    EOSRELPCT 1.4 02/22/2021    BASOPCT 0.1 02/22/2021    LYMPHOPCT 28.2 02/22/2021    MONOPCT 12.8 (H) 02/22/2021    SEGSABS 4.6 02/22/2021    EOSABS 0.1 02/22/2021    BASOSABS 0.0 02/22/2021    LYMPHSABS 2.3 02/22/2021    MONOSABS 1.0 02/22/2021    DIFFTYPE AUTOMATED DIFFERENTIAL 02/22/2021     Lab Results   Component Value Date    ESR 33 (H) 04/08/2018       Chemistry:  Lab Results   Component Value Date     02/22/2021    K 4.3 02/22/2021     02/22/2021    CO2 26 02/22/2021    BUN 26 (H) 02/22/2021    CREATININE 1.6 (H) 02/22/2021    GLUCOSE 77 02/22/2021    CALCIUM 9.4 02/22/2021    PROT 6.7 02/22/2021    LABALBU 3.8 02/22/2021    BILITOT 0.5 02/22/2021    ALKPHOS 90 02/22/2021    AST 17 02/22/2021    ALT 10 02/22/2021    LABGLOM 31 (L) 02/22/2021    GFRAA 38 (L) 02/22/2021     No results found for: MMA, LDH, HOMOCYSTEINE  No components found for: LD  No results found for: TSHHS, T4FREE, FT3    Immunology:  Lab Results   Component Value Date    PROT 6.7 02/22/2021     No results found for: [place], KLFLCR  No results found for: B2M    Coagulation Panel:  Lab Results   Component Value Date    PROTIME 12.2 04/08/2018    INR 1.05 04/08/2018       Anemia Panel:  No results found for: [id number], FOLATE    Tumor Markers:  Lab Results   Component Value Date     11 03/05/2020      03/05/2020     (NOTE)  INTERPRETIVE INFORMATION: Cancer Antigen 125  The Roche  electrochemiluminescent immunoassay was used. Results obtained with different test methods or kits cannot be   used interchangeably. The  test is used as an aid in   monitoring response to therapy for patients with epithelial   ovarian cancer. Serial testing for patient  values should be   used in conjunction with other clinical methods for monitoring   ovarian cancer. Patients with confirmed ovarian carcinoma may have   pretreatment  values in the same range as healthy   individuals. Elevations may be observed in patients with   nonmalignant disease. Therefore, a  value, regardless of   level, should not be interpreted as absolute evidence of the   presence or absence of malignant disease.   Performed by [facility] [place] [id number], [id number] [facility] [phone]  www. Willian Decker MD, Lab. Director      Cathryn Duran 30.5 02/22/2021         Imaging: Reviewed     Pathology:Reviewed     Observations:  Performance Status: ECOG 1  Depression Status: PHQ-9 Total Score: 0 (8/30/2021  2:26 PM)          Assessment & Plan:  Dr Lui Soliman Notes:  -----------------------  August 18, 2017, I did spend time with Ms. Espinoza and her son and daughter. She is an extremely pleasant lady, patient of Dr. River Juarez. Overall, she has been blessed with good health. Only history of hypertension and in the past has had tubal ligation and surgery for cataract. She has been getting mammograms regularly, as well as colonoscopy. Last colonoscopy 2016 per Dr. Elsa Elliott. In 2016 her mammogram was unremarkable, but June 2017, changes of new architectural distortion in the left breast. That led to ultrasound and finally biopsy. Lesion was read to be 1.8 cm. There was an enlarged node in the left axillary area, but the biopsy from the axillary node was negative. Biopsy from the left breast revealed invasive ductal carcinoma, negative for presence of DCIS, LCIS, or lymphovascular invasion. Tumor was negative for ER, ND, and HER-2. Initial chest x-ray was negative. Denied any bony pains at that time. CBC and chemistries apparently were unremarkable, creatinine being 1.4. July 25, 2017, did undergo lumpectomy and sentinel node biopsy. One out of two sentinel nodes were positive. No additional nodes were removed. The lumpectomy specimen reveals primary tumor to be 2.2 cm, high-grade, negative margins. August 18, 2017, we did talk about definite need for adjuvant chemotherapy followed by radiation therapy treatments. She did see Dr. Joseline Javier the same day. She did undergo CT scan of the chest, abdomen, and pelvis as well as head on August 21, 2017, and those were negative.      August 31, 2017, she was to start adjuvant therapy but we had trouble with the port that had to be repositioned by Dr. Cherise Candelario on the other side. She did start A/C on September 7, 2017, received her fourth cycle on November 9, 2017. She tolerated those treatments with moderate degree of morbidity, but overall did fairly well. Had some issue with nausea, irritability, acid reflux, and skin discoloration. January 2, 2018, in general was doing fair, overall tolerating these treatments fairly well, some symptoms of weakness and tiredness. A little bit of peripheral numbness and tingling. Issue with acid reflux, was going to try Prilosec for the same. Also had issue with fresh red blood per rectum, was evaluated by Dr. Jorge Sams and Dr. Steffanie Pennington too. February 27, 2018: Started on weekly Taxol on November 30, 2017, finishing on February 23, 2018. She tolerated these treatments with significant symptoms of weakness and tiredness as well as muscle and joint discomfort and symptoms of neuropathy. February 27, 2018, I did spend time with her. I am hoping most of these symptoms of weakness, tiredness, neuropathy, and hyperpigmentation would improve with time. We talked about her having to see Dr. Niharika Moreno in March for radiation therapy treatments. Following the radiation therapy treatments we could discuss with her about the need for any additional testing, which she is anxious about. Mrs. Espinoza saw me on April 02, 2018, prior to that she had seen Dr. Niharika Moreno and was scheduled to start radiation therapy treatments in a couple of weeks. On the morning of April 2, 2018, she had seen Dr. Cherise Candelario for removal of the Mediport and complained to him about swelling involving the left neck, arm, and had for two days duration, and Doppler done in his office had revealed changes of deep vein thrombosis. April 2, 2018, was started on treatment with Lovenox 60 milligrams q.12h., decreasing dosages because of elevated creatinine.     She was admitted to the hospital on April 8, 2018, discharged on April 9, 2018, with acute gout involving the right ankle as well as cellulitis. Doppler in the hospital was negative for deep vein thrombosis in the right leg. She also had CT of the abdomen and pelvis on April 8, 2018, and that too was negative for any distinct masses. On April, 10, 2018, we talked about continuing on anticoagulation for 6 to 12 weeks, after that removal of the port but as the Lovenox was quite expensive, we did switch over to Xarelto. May 10, 2018, she was doing quite well with 20 milligrams of Xarelto per day tolerating it quite well, no issue with bleeding. She was undergoing radiation therapy treatments per Dr. Enedelia Roe too. Finished receiving radiation therapy treatments on June 5, 2018    Had a nice visit with Patrick Ocasio for survivorship on August 15, 2018. July 18, 2019 did spend time with her. Overall she was doing well some symptoms of neuropathy. Also a few weeks history of dysphagia. Exam was fairly unremarkable. Blood work and tumor markers were drawn and advised her to have CT scans of the neck as well as chest as part of work-up for hoarseness of voice. She was also advised to see Dr. Kaleigh Vallejo for the same. CT scan of the neck done on July 25, 2019 was basically negative for any distinct mass. CT scan of the chest done on July 25, 2019 2 was negative for any significant adenopathy. There was slight changes of cardiomegaly with minor atherosclerotic changes. There was a 7 mm noncalcified right middle lobe pulmonary nodule etiology of that was unclear and repeat study in few months was recommended. July 9, 2020 overall she was doing quite well. Still some symptoms of neuropathy from earlier treatment but clinically no signs of recurrence. Regarding carcinoma the breast her plan being continuing to follow her progress through exams and mammograms.  She has been getting her mammograms per her PCP as well as Dr. Tamica Sears her gynecologist. Other issue has been a tiny 8 mm nodule in noted on the CT scan of the chest that had been stable but we will recheck her CAT scan again in 4 to 6months. We also been monitoring her tumor markers specially with aggressive nature of the disease. I did advise her that on her next visit it quite likely would be 1 of my partners who would be seeing her      Dr Claritza Liang Notes:  ------------------------  Left Breast cancer: Diagnosed in 8/18/2017 stage IIb, T2 N1 M0. Triple negative. Underwent lumpectomy followed by chemotherapy with Adriamycin, Cytoxan followed by Taxol. Completed adjuvant radiation. Feb 2021 Ca 27.29 was normal, repeat today pending. August 2021 mammogram results will be obtained. Repeat in a year if normal. We will continue to follow-up NCCN guidelines. CKD: Creatinine 1.6. Avoid nephrotoxic medications and adequate hydration. Lung nodule: Ct chest august 2021 with stable 0.8cm nodule, stable. Repeat CT chest in a year    Continue the medical care. Discussed above findings and plan with her and she verbalized understanding. Answered all her questions. Discussed healthy lifestyle including weight loss, healthy diet and regular exercise as tolerated. Also discussed importance of being up-to-date with age-appropriate screening tools. Colonoscopy 6-7 years ago 2014 maybe, is not sure when repeat is recommended. Asked her to call Dr Dino Trevino.      Recommend follow-up with primary care physician and other specialist.    Return to clinic in march 2022  or earlier as needed    [address]       Electronically signed by Isael Meneses MD on 8/30/2021 at 2:44 PM

## 2021-09-02 LAB — CA 27.29: 29.1 U/ML

## 2021-10-01 DIAGNOSIS — N28.9 KIDNEY FUNCTION ABNORMAL: Primary | ICD-10-CM

## 2021-10-01 NOTE — PROGRESS NOTES
Patient's BUN and creatinine have been elevated past few times. Per Dr. Sue Ragsdale - referral placed for nephrology. Copy of recent labs mailed to patient.

## 2021-10-25 PROBLEM — I10 PRIMARY HYPERTENSION: Status: ACTIVE | Noted: 2021-10-25

## 2021-10-25 PROBLEM — N18.32 STAGE 3B CHRONIC KIDNEY DISEASE (HCC): Status: ACTIVE | Noted: 2021-10-25

## 2021-10-26 RX ORDER — GABAPENTIN 600 MG/1
TABLET ORAL
Qty: 90 TABLET | Refills: 0 | Status: SHIPPED | OUTPATIENT
Start: 2021-10-26 | End: 2021-12-14 | Stop reason: SDUPTHER

## 2021-10-26 NOTE — TELEPHONE ENCOUNTER
Pt called in requesting refill on Gabapentin 600mg and would like it sent to [facility] [address]    Medication sent to pharmacy

## 2021-11-29 ENCOUNTER — HOSPITAL ENCOUNTER (OUTPATIENT)
Age: 76
Discharge: HOME | End: 2021-11-29
Payer: MEDICARE

## 2021-11-29 DIAGNOSIS — G89.29 CHRONIC BILATERAL LOW BACK PAIN WITH LEFT-SIDED SCIATICA: ICD-10-CM

## 2021-11-29 DIAGNOSIS — I10 PRIMARY HYPERTENSION: ICD-10-CM

## 2021-11-29 DIAGNOSIS — N18.32 STAGE 3B CHRONIC KIDNEY DISEASE (HCC): ICD-10-CM

## 2021-11-29 DIAGNOSIS — M10.9 ACUTE GOUT OF RIGHT ANKLE, UNSPECIFIED CAUSE: ICD-10-CM

## 2021-11-29 DIAGNOSIS — M54.42 CHRONIC BILATERAL LOW BACK PAIN WITH LEFT-SIDED SCIATICA: ICD-10-CM

## 2021-11-29 DIAGNOSIS — C50.412 MALIGNANT NEOPLASM OF UPPER-OUTER QUADRANT OF LEFT FEMALE BREAST, UNSPECIFIED ESTROGEN RECEPTOR STATUS (HCC): ICD-10-CM

## 2021-11-29 LAB
ALBUMIN SERPL-MCNC: 4.1 GM/DL (ref 3.4–5)
ANION GAP SERPL CALCULATED.3IONS-SCNC: 13 MMOL/L (ref 4–16)
BACTERIA: ABNORMAL /HPF
BASOPHILS ABSOLUTE: 0 K/CU MM
BASOPHILS RELATIVE PERCENT: 0.5 % (ref 0–1)
BILIRUBIN URINE: NEGATIVE MG/DL
BLOOD, URINE: NEGATIVE
BUN BLDV-MCNC: 29 MG/DL (ref 6–23)
CALCIUM SERPL-MCNC: 9.6 MG/DL (ref 8.3–10.6)
CHLORIDE BLD-SCNC: 105 MMOL/L (ref 99–110)
CLARITY: ABNORMAL
CO2: 26 MMOL/L (ref 21–32)
COLOR: YELLOW
CREAT SERPL-MCNC: 1.4 MG/DL (ref 0.6–1.1)
CREATININE URINE: 107.7 MG/DL (ref 28–217)
DIFFERENTIAL TYPE: ABNORMAL
EOSINOPHILS ABSOLUTE: 0.2 K/CU MM
EOSINOPHILS RELATIVE PERCENT: 3.2 % (ref 0–3)
GFR AFRICAN AMERICAN: 44 ML/MIN/1.73M2
GFR NON-AFRICAN AMERICAN: 36 ML/MIN/1.73M2
GLUCOSE BLD-MCNC: 83 MG/DL (ref 70–99)
GLUCOSE, URINE: NEGATIVE MG/DL
HCT VFR BLD CALC: 40 % (ref 37–47)
HEMOGLOBIN: 12.5 GM/DL (ref 12.5–16)
IMMATURE NEUTROPHIL %: 0.2 % (ref 0–0.43)
KETONES, URINE: NEGATIVE MG/DL
LEUKOCYTE ESTERASE, URINE: ABNORMAL
LYMPHOCYTES ABSOLUTE: 2.3 K/CU MM
LYMPHOCYTES RELATIVE PERCENT: 35.7 % (ref 24–44)
MAGNESIUM: 2.3 MG/DL (ref 1.8–2.4)
MCH RBC QN AUTO: 26.8 PG (ref 27–31)
MCHC RBC AUTO-ENTMCNC: 31.3 % (ref 32–36)
MCV RBC AUTO: 85.7 FL (ref 78–100)
MONOCYTES ABSOLUTE: 0.6 K/CU MM
MONOCYTES RELATIVE PERCENT: 9.7 % (ref 0–4)
NITRITE URINE, QUANTITATIVE: NEGATIVE
NUCLEATED RBC %: 0 %
PDW BLD-RTO: 16.4 % (ref 11.7–14.9)
PH, URINE: 5 (ref 5–8)
PHOSPHORUS: 3.1 MG/DL (ref 2.5–4.9)
PLATELET # BLD: 226 K/CU MM (ref 140–440)
PMV BLD AUTO: 11.9 FL (ref 7.5–11.1)
POTASSIUM SERPL-SCNC: 4.4 MMOL/L (ref 3.5–5.1)
PROT/CREAT RATIO, UR: 0.1
PROTEIN UA: NEGATIVE MG/DL
RBC # BLD: 4.67 M/CU MM (ref 4.2–5.4)
RBC URINE: 6 /HPF (ref 0–6)
SEGMENTED NEUTROPHILS ABSOLUTE COUNT: 3.3 K/CU MM
SEGMENTED NEUTROPHILS RELATIVE PERCENT: 50.7 % (ref 36–66)
SODIUM BLD-SCNC: 144 MMOL/L (ref 135–145)
SPECIFIC GRAVITY UA: 1.01 (ref 1–1.03)
SQUAMOUS EPITHELIAL: 10 /HPF
TOTAL IMMATURE NEUTOROPHIL: 0.01 K/CU MM
TOTAL NUCLEATED RBC: 0 K/CU MM
TRICHOMONAS: ABNORMAL /HPF
TSH HIGH SENSITIVITY: 1.95 UIU/ML (ref 0.27–4.2)
URIC ACID: 7.1 MG/DL (ref 2.6–6)
URINE TOTAL PROTEIN: 13.8 MG/DL
UROBILINOGEN, URINE: NEGATIVE MG/DL (ref 0.2–1)
WBC # BLD: 6.5 K/CU MM (ref 4–10.5)
WBC UA: 64 /HPF (ref 0–5)

## 2021-11-29 PROCEDURE — 81001 URINALYSIS AUTO W/SCOPE: CPT

## 2021-11-29 PROCEDURE — 84443 ASSAY THYROID STIM HORMONE: CPT

## 2021-11-29 PROCEDURE — 84550 ASSAY OF BLOOD/URIC ACID: CPT

## 2021-11-29 PROCEDURE — 80048 BASIC METABOLIC PNL TOTAL CA: CPT

## 2021-11-29 PROCEDURE — 83735 ASSAY OF MAGNESIUM: CPT

## 2021-11-29 PROCEDURE — 84165 PROTEIN E-PHORESIS SERUM: CPT

## 2021-11-29 PROCEDURE — 82570 ASSAY OF URINE CREATININE: CPT

## 2021-11-29 PROCEDURE — 85025 COMPLETE CBC W/AUTO DIFF WBC: CPT

## 2021-11-29 PROCEDURE — 84156 ASSAY OF PROTEIN URINE: CPT

## 2021-11-29 PROCEDURE — 84100 ASSAY OF PHOSPHORUS: CPT

## 2021-11-29 PROCEDURE — 84166 PROTEIN E-PHORESIS/URINE/CSF: CPT

## 2021-11-29 PROCEDURE — 86160 COMPLEMENT ANTIGEN: CPT

## 2021-11-29 PROCEDURE — 82040 ASSAY OF SERUM ALBUMIN: CPT

## 2021-11-29 PROCEDURE — 36415 COLL VENOUS BLD VENIPUNCTURE: CPT

## 2021-11-29 PROCEDURE — 83970 ASSAY OF PARATHORMONE: CPT

## 2021-12-01 LAB
COMPLEMENT C3: 178 MG/DL (ref 88–201)
COMPLEMENT C4: 43 MG/DL (ref 10–40)
PARATHYROID HORMONE INTACT: 68 PG/ML (ref 15–65)

## 2021-12-02 LAB
ALBUMIN ELP: 3.7 GM/DL (ref 3.2–5.6)
ALBUMIN, U: 100 %
ALPHA-1-GLOBULIN, U: 0 %
ALPHA-1-GLOBULIN: 0.3 GM/DL (ref 0.1–0.4)
ALPHA-2-GLOBULIN, U: 0 %
ALPHA-2-GLOBULIN: 0.7 GM/DL (ref 0.4–1.2)
BETA GLOBULIN, U: 0 %
BETA GLOBULIN: 1.1 GM/DL (ref 0.5–1.3)
GAMMA GLOBULIN, U: 0 %
GAMMA GLOBULIN: 1.3 GM/DL (ref 0.5–1.6)
SPEP INTERPRETATION: NORMAL
SPEP INTERPRETATION: NORMAL
TOTAL PROTEIN: 7.1 GM/DL (ref 6.4–8.2)
URINE TOTAL PROTEIN: 13.8 MG/DL

## 2021-12-09 PROBLEM — M1A.09X0 CHRONIC GOUT OF MULTIPLE SITES: Status: ACTIVE | Noted: 2021-12-09

## 2021-12-14 RX ORDER — GABAPENTIN 600 MG/1
600 TABLET ORAL 3 TIMES DAILY
Qty: 90 TABLET | Refills: 5 | Status: SHIPPED | OUTPATIENT
Start: 2021-12-14 | End: 2022-02-22 | Stop reason: SDUPTHER

## 2021-12-14 NOTE — TELEPHONE ENCOUNTER
Patient called in requesting refill on Gabapentin 600mg.     Request sent to Dr. Jayashree Jackson for approval.

## 2021-12-23 ENCOUNTER — CLINICAL DOCUMENTATION (OUTPATIENT)
Dept: CASE MANAGEMENT | Age: 76
End: 2021-12-23

## 2021-12-23 NOTE — PROGRESS NOTES
Disability parking placard letter printed and given to patient due to having limited walking ability due to chronic neuropathy from past chemotherapy treatments.

## 2022-02-22 ENCOUNTER — HOSPITAL ENCOUNTER (OUTPATIENT)
Dept: INFUSION THERAPY | Age: 77
Discharge: HOME | End: 2022-02-22
Payer: MEDICARE

## 2022-02-22 DIAGNOSIS — Z12.31 SCREENING MAMMOGRAM, ENCOUNTER FOR: ICD-10-CM

## 2022-02-22 DIAGNOSIS — C50.412 MALIGNANT NEOPLASM OF UPPER-OUTER QUADRANT OF LEFT FEMALE BREAST, UNSPECIFIED ESTROGEN RECEPTOR STATUS (HCC): ICD-10-CM

## 2022-02-22 LAB
ALBUMIN SERPL-MCNC: 3.9 GM/DL (ref 3.4–5)
ALP BLD-CCNC: 102 IU/L (ref 40–129)
ALT SERPL-CCNC: 13 U/L (ref 10–40)
ANION GAP SERPL CALCULATED.3IONS-SCNC: 12 MMOL/L (ref 4–16)
AST SERPL-CCNC: 21 IU/L (ref 15–37)
BASOPHILS ABSOLUTE: 0 K/CU MM
BASOPHILS RELATIVE PERCENT: 0.4 % (ref 0–1)
BILIRUB SERPL-MCNC: 0.7 MG/DL (ref 0–1)
BUN BLDV-MCNC: 21 MG/DL (ref 6–23)
CALCIUM SERPL-MCNC: 9.4 MG/DL (ref 8.3–10.6)
CHLORIDE BLD-SCNC: 104 MMOL/L (ref 99–110)
CO2: 24 MMOL/L (ref 21–32)
CREAT SERPL-MCNC: 1.5 MG/DL (ref 0.6–1.1)
DIFFERENTIAL TYPE: ABNORMAL
EOSINOPHILS ABSOLUTE: 0.2 K/CU MM
EOSINOPHILS RELATIVE PERCENT: 2.9 % (ref 0–3)
GFR AFRICAN AMERICAN: 41 ML/MIN/1.73M2
GFR NON-AFRICAN AMERICAN: 34 ML/MIN/1.73M2
GLUCOSE BLD-MCNC: 85 MG/DL (ref 70–99)
HCT VFR BLD CALC: 37.5 % (ref 37–47)
HEMOGLOBIN: 12.5 GM/DL (ref 12.5–16)
LYMPHOCYTES ABSOLUTE: 2.1 K/CU MM
LYMPHOCYTES RELATIVE PERCENT: 30.3 % (ref 24–44)
MCH RBC QN AUTO: 26.8 PG (ref 27–31)
MCHC RBC AUTO-ENTMCNC: 33.3 % (ref 32–36)
MCV RBC AUTO: 80.3 FL (ref 78–100)
MONOCYTES ABSOLUTE: 0.7 K/CU MM
MONOCYTES RELATIVE PERCENT: 9.6 % (ref 0–4)
PDW BLD-RTO: 18.6 % (ref 11.7–14.9)
PLATELET # BLD: 177 K/CU MM (ref 140–440)
PMV BLD AUTO: 11.5 FL (ref 7.5–11.1)
POTASSIUM SERPL-SCNC: 4.2 MMOL/L (ref 3.5–5.1)
RBC # BLD: 4.67 M/CU MM (ref 4.2–5.4)
SEGMENTED NEUTROPHILS ABSOLUTE COUNT: 4 K/CU MM
SEGMENTED NEUTROPHILS RELATIVE PERCENT: 56.8 % (ref 36–66)
SODIUM BLD-SCNC: 140 MMOL/L (ref 135–145)
TOTAL PROTEIN: 6.6 GM/DL (ref 6.4–8.2)
WBC # BLD: 7 K/CU MM (ref 4–10.5)

## 2022-02-22 PROCEDURE — 36415 COLL VENOUS BLD VENIPUNCTURE: CPT

## 2022-02-22 PROCEDURE — 86300 IMMUNOASSAY TUMOR CA 15-3: CPT

## 2022-02-22 PROCEDURE — 80053 COMPREHEN METABOLIC PANEL: CPT

## 2022-02-22 PROCEDURE — 85025 COMPLETE CBC W/AUTO DIFF WBC: CPT

## 2022-02-22 RX ORDER — GABAPENTIN 600 MG/1
600 TABLET ORAL 3 TIMES DAILY
Qty: 90 TABLET | Refills: 5 | Status: SHIPPED | OUTPATIENT
Start: 2022-02-22 | End: 2022-08-31 | Stop reason: SDUPTHER

## 2022-02-25 LAB — CA 27.29: 30.5 U/ML

## 2022-03-01 ENCOUNTER — OFFICE VISIT (OUTPATIENT)
Dept: ONCOLOGY | Age: 77
End: 2022-03-01
Payer: MEDICARE

## 2022-03-01 ENCOUNTER — HOSPITAL ENCOUNTER (OUTPATIENT)
Dept: INFUSION THERAPY | Age: 77
Discharge: HOME | End: 2022-03-01

## 2022-03-01 VITALS
HEIGHT: 63 IN | OXYGEN SATURATION: 96 % | DIASTOLIC BLOOD PRESSURE: 81 MMHG | HEART RATE: 77 BPM | TEMPERATURE: 97.3 F | RESPIRATION RATE: 18 BRPM | WEIGHT: 175.4 LBS | SYSTOLIC BLOOD PRESSURE: 140 MMHG | BODY MASS INDEX: 31.08 KG/M2

## 2022-03-01 DIAGNOSIS — C50.412 MALIGNANT NEOPLASM OF UPPER-OUTER QUADRANT OF LEFT FEMALE BREAST, UNSPECIFIED ESTROGEN RECEPTOR STATUS (HCC): Primary | ICD-10-CM

## 2022-03-01 DIAGNOSIS — R91.1 NODULE OF RIGHT LUNG: ICD-10-CM

## 2022-03-01 PROCEDURE — G8417 CALC BMI ABV UP PARAM F/U: HCPCS | Performed by: INTERNAL MEDICINE

## 2022-03-01 PROCEDURE — 1123F ACP DISCUSS/DSCN MKR DOCD: CPT | Performed by: INTERNAL MEDICINE

## 2022-03-01 PROCEDURE — 1090F PRES/ABSN URINE INCON ASSESS: CPT | Performed by: INTERNAL MEDICINE

## 2022-03-01 PROCEDURE — 99214 OFFICE O/P EST MOD 30 MIN: CPT | Performed by: INTERNAL MEDICINE

## 2022-03-01 PROCEDURE — G8484 FLU IMMUNIZE NO ADMIN: HCPCS | Performed by: INTERNAL MEDICINE

## 2022-03-01 PROCEDURE — G8427 DOCREV CUR MEDS BY ELIG CLIN: HCPCS | Performed by: INTERNAL MEDICINE

## 2022-03-01 PROCEDURE — 4040F PNEUMOC VAC/ADMIN/RCVD: CPT | Performed by: INTERNAL MEDICINE

## 2022-03-01 PROCEDURE — 1036F TOBACCO NON-USER: CPT | Performed by: INTERNAL MEDICINE

## 2022-03-01 PROCEDURE — G8400 PT W/DXA NO RESULTS DOC: HCPCS | Performed by: INTERNAL MEDICINE

## 2022-03-01 ASSESSMENT — PATIENT HEALTH QUESTIONNAIRE - PHQ9
SUM OF ALL RESPONSES TO PHQ QUESTIONS 1-9: 0
SUM OF ALL RESPONSES TO PHQ9 QUESTIONS 1 & 2: 0
SUM OF ALL RESPONSES TO PHQ QUESTIONS 1-9: 0
2. FEELING DOWN, DEPRESSED OR HOPELESS: 0
1. LITTLE INTEREST OR PLEASURE IN DOING THINGS: 0

## 2022-03-01 NOTE — PROGRESS NOTES
MA Rooming Questions  Patient: Violet Mratines  MRN: [id number]    Date: 3/1/2022        1. Do you have any new issues?   no         2. Do you need any refills on medications?    no    3. Have you had any imaging done since your last visit?   no    4. Have you been hospitalized or seen in the emergency room since your last visit here?   no    5. Did the patient have a depression screening completed today?  Yes    No data recorded     PHQ-9 Given to (if applicable):               PHQ-9 Score (if applicable):                     [] Positive     []  Negative              Does question #9 need addressed (if applicable)                     [] Yes    []  No               Vicky Reese [facility]

## 2022-03-01 NOTE — PROGRESS NOTES
Message left on answering machine regarding medication cahnges and the need to have BP checked.   To call readings to Dr Roberto Osborn next week Patient Name: Audie Current  Patient : 1945  Patient MRN: [id number]     Primary Oncologist: Carmina White MD  Referring Physician: SURESH Guzman [address]       Date of Service: 3/1/2022      Chief Complaint:   Chief Complaint   Patient presents with    Follow-up        Active Problem list  1. Malignant neoplasm of upper-outer quadrant of left female breast, unspecified estrogen receptor status ([facility] [address]    2. Nodule of right lung    Primary malignant neoplasm of female breast (disorder) ( Stage Date: 2017, Stage IIB (Primary, Left breast upper-outer quadrant, T2, pN1a, M0)  Date of Dx:2017 Disease State: Stable disease; Histopathologic Type: Ductal invasive carcinoma; Histologic Grade-T[place]see: G3; ER Status: Negative; MT Status: Negative; Androgen Receptor Status: Unknown; HER-2/michi Value-FISH: Negative; Menopausal Status: Postmenopausal; First record:2017 Last record:2017 Other Migrated ICD10: C50.412 )         HPI:   Dr Alvin Chen Notes:  ------------------------       Ms. Espinoza underwent routine mammogram on 2017, and was noted to have new asymmetry involving the left breast at 2 o'clock position. Additional studies were recommended including ultrasound on 2017, showing highly suspicious left breast mass, correlating on mammographic area, also abnormal left axillary lymph node was suspicious for metastatic disease. 2017, she did undergo ultrasound-guided biopsy of the left breast mass as well as the left axillary mass. Biopsy from the left breast revealing infiltrating ductal carcinoma. Preliminary biopsy from the lymph node was read as possible metastatic disease, but final pathology was negative for disease involving axillary node. The breast lesion was infiltrating ductal, grade 2, negative for presence of DCIS, LCIS, or lymphovascular invasion. ER, MT, HER2 were negative. Chest x-ray 2017, was negative.    She saw me on July 21, 2017. Prior to that, she had been scheduled to undergo surgery per Dr. Blu Cruz on July 25, 2017. On July 21, we did talk about the role for neoadjuvant chemotherapy, especially with triple-negative disease. Her tumor was felt to be less than 2 cm and it was felt that maybe the nodes were negative. She already was going to have surgery in the form of lumpectomy and axillary treasure sampling. She did undergo surgery on July 25, 2017, in the form of left lumpectomy and sentinel node biopsy, as well as placement of a port. She tolerated that procedure quite well. Pathology from the specimen revealed invasive ductal carcinoma, grade 3, 2.2 cm in greatest dimension, presence of ductal carcinoma in situ, and one out of two nodes being positive for metastatic disease, 9 mm in greatest dimension. Recovered well from surgery. CA 27-29 level on August 18, 2017, was 25.8. CT scan of the chest, abdomen, and pelvis August 21, 2017, was negative for any metastases. CT scan of the head too was negative for any metastasis. After receiving information about adjuvant chemotherapy treatments, she was also seen by Dr. Charu Santillan on August 18, 2017, and decision was to proceed with chemotherapy treatments first and then go on to radiation therapy treatments. She was to start her first cycle of treatment on August 31, 2017, but we had trouble accessing the port and she was evaluated by IR and she was noted to have the tip of the Mediport projecting into the right internal jugular vein. The catheter demonstrated 360 degree loop. Subsequently, she saw Dr. Blu Cruz again and the port was inserted on the other side and she was able to start her treatment with Adriamycin and Cytoxan on September 7, 2017.  She did of course receive Kytril, Emend, olanzapine, and dexamethasone as well as Neulasta  Saw me on November 27, 2017, had received her fourth cycle of treatment with A/C on November 9, 2017, with moderate symptoms of weakness and tiredness, some nausea, some epigastric discomfort, as well as hyperpigmentation, but overall she was still able to do quite q few of her day to day activities fairly well. Started on weekly Taxol on November 30, 2017, and finished on February 23, 2018. She tolerated these treatments with a moderate degree of morbidity, significant weakness, tiredness, hair loss, as well as skin hyperpigmentation. She subsequently did see Dr. Robert Dubose and arrangements were made for her to start radiation therapy treatments in mid April 2018. She saw me on April 02, 2018, still was having symptoms of weakness, tiredness, and peripheral neuropathy. She also had seen Dr. Jael Hawkins the morning of April 2, 2018, for removal of Mediport but had complained to him about significant swelling involving the left arm as well as neck area which had come up in a couple of days and Doppler done at his office had revealed changes of deep vein thrombosis. After removal of the port she came to see me in the office. April 2, 2018, she was started on treatment with Lovenox 60 milligrams q.12, decreasing dose because of mild elevation of creatinine. The plan was to possibly treat her for 6 to 12 weeks and hold off further Lovenox and maybe keep her on aspirin. DVT was felt to be provoked. She was admitted to the hospital on April 8, 2018, and discharged on April 9, 2018. She was admitted with symptoms of cellulitis in the right leg as well as possible acute gout. Ultrasound of the right leg was negative for any deep vein thrombosis and CT scan of the abdomen and pelvis was negative for any distinct masses. She was treated with steroids and antibiotics and discharged. When seen in the office on April 10, 2018, she was doing quite well, was doing much better as far as the discomfort involving the leg. She stated that she still had symptoms of neuropathy but did not feel that she needed to take gabapentin.  She also complained of Lovenox being too expensive and wanted to switch over to Xarelto. Visit here on May 10, 2018, was being treated with Xarelto 20 milligrams daily, tolerating it quite well. The swelling in the leg had improved and she had not noticed any bleeding anywhere. She was undergoing radiation therapy treatments and so far had been tolerating it fairly well. She was seen in the office on July 10, 2018, had finished receiving radiation therapy treatments with mild degree of mobility, had recovered fairly well. Major issue still was symptoms of peripheral neuropathy poorly controlled with gabapentin 600 mg twice a day. If it was not for neuropathy she in general would be feeling quite well. She was finished receiving treatments with Lovenox. After finishing above-mentioned treatments she did see Bebe Mae on August 15, 2018 for survivorship visit. She was seen in the office on September 11, 2018 was doing quite well stated had a mammogram per Dr. Marleni Pandey late July 2018 and that was unremarkable. She did undergo bone scan per Dr. Marleni Pandey on January 23, 2019 for muscle and joint discomfort study was unremarkable. She was seen in the office on March 12, 2019. Still was having symptoms of peripheral neuropathy was taking gabapentin 600 mg twice a day did encourage her to take maybe 3 times a day. July 18, 2019 did spend time with her. Overall she was doing well some symptoms of neuropathy. Also a few weeks history of dysphagia. Exam was fairly unremarkable. Blood work and tumor markers were drawn and advised her to have CT scans of the neck as well as chest as part of work-up for hoarseness of voice. She was also advised to see Dr. Keyanna Nguyễn for the same. CT scan of the neck done on July 25, 2019 was basically negative for any distinct mass. CT scan of the chest done on July 25, 2019 2 was negative for any significant adenopathy.  There was slight changes of cardiomegaly with minor atherosclerotic changes. There was a 7 mm noncalcified right middle lobe pulmonary nodule etiology of that was unclear and repeat study in few months was recommended. CT scan of the chest done on November 7, 2019 revealed stable 8 mm noncalcified right middle lobe subpleural nodule compared to the CT scan done on July 25, 2019. Still somewhat concerning as it was felt to be new since earlier CAT scan in 2018. CT scan of the chest done on March 5, 2020 again revealed stable 8 mm right middle lobe pulmonary nodule continued surveillance was recommended. July 9, 2020 she was seen in the office in general was doing quite well still symptoms of neuropathy gabapentin still partially helpful otherwise in decent spirits managing her affairs enjoying her family and grandchildren. Still was being followed by her primary care physician as well as Dr. Javier Vu getting mammograms through them. Past medical problems include hypertension. Past surgical history includes tubal ligation and cataract involving both eyes. Dr Kimmie Durán Notes:  -----------------------  8/18/20: Mammogram with no suspicious lesions in either breast. BIRADS cat 2    2/15/21; Ct chest:  1. Unchanged 0.8 cm solid nodule in the right middle lobe.  Stability is   reassuring, although absence on 04/08/2018 suggests the possibility of   primary or secondary malignancy.  Recommend continued attention on follow-up   imaging with consideration for PET/CT or percutaneous biopsy.  Of note, the   Fleischner Society recommendations for solid pulmonary nodules do not apply   in the setting of prior malignancy. 2. Changes of left partial mastectomy with no findings of disease recurrence. 3. Subtle new patchy peribronchovascular and subpleural groundglass near the   lung bases likely due to an infectious or inflammatory process, potentially   including atypical pneumonia. 8/2021: Ct chest:  1. No acute intrathoracic abnormality.    2. 8 mm pleural based nodule in the right middle lobe is unchanged. Continued attention at follow-up is recommended. August 2021 mammogram was done and she is unaware of the results    Interval History  3/1/2022:Arrived alone to the clinic today. Overall feels fine. Reported very uncomfortable feeling in the hands which has been chronic, Neurontin does not really help. No breast masses, nipple discharge, erythema. No fever, cough. Appetite is good and denied any weight loss. No chest pain, increased sob, palpitations or  Any dizziness. No abdominal pain. No nausea, emesis, diarrhea or any constipation. No  sx. No LE edema bilaterally. No HA, vision changes or any focal weakness. Review of Systems   Per interval history; otherwise 10 point ROS is negative              Vital Signs:  BP (!) 140/81 (Site: Right Upper Arm, Position: Sitting, Cuff Size: Large Adult)   Pulse 77   Temp 97.3 °F (36.3 °C) (Temporal)   Resp 18   Ht 5' 3\" (1.6 m)   Wt 175 lb 6.4 oz (79.6 kg)   SpO2 96%   BMI 31.07 kg/m²     Physical Exam:    CONSTITUTIONAL: aao x 3, no distress, overweight/obese   EYES: No palor or any icetrus  ENT:ATNC  NECK:no JVD  HEMATOLOGIC/LYMPHATIC: no cervical, supraclavicular or axillary lymphadenopathy   LUNGS: CTAB  CARDIOVASCULAR: s1s2 rrr  ABDOMEN: soft ntnd bs pos no hsm  NEUROLOGIC:GI  SKIN: No rash  EXTREMITIES: no LE edema bilaterally  Breast: left lumpectomy scar intact, mild skin radiation changes, underlying fibrosis, did not really feel a discrete mass , no axillary lad .  Rt breast normal.     Labs:    Hematology:  Lab Results   Component Value Date    WBC 7.0 02/22/2022    RBC 4.67 02/22/2022    HGB 12.5 02/22/2022    HCT 37.5 02/22/2022    MCV 80.3 02/22/2022    MCH 26.8 (L) 02/22/2022    MCHC 33.3 02/22/2022    RDW 18.6 (H) 02/22/2022     02/22/2022    MPV 11.5 (H) 02/22/2022    SEGSPCT 56.8 02/22/2022    EOSRELPCT 2.9 02/22/2022    BASOPCT 0.4 02/22/2022    LYMPHOPCT 30.3 02/22/2022 patient  values should be   used in conjunction with other clinical methods for monitoring   ovarian cancer. Patients with confirmed ovarian carcinoma may have   pretreatment  values in the same range as healthy   individuals. Elevations may be observed in patients with   nonmalignant disease. Therefore, a  value, regardless of   level, should not be interpreted as absolute evidence of the   presence or absence of malignant disease. Performed by [facility]  [address], [id number] [facility] [phone]  www. Johnathon Lo MD, Lab. Director      Scotty Loyola 30.5 02/22/2022         Imaging: Reviewed     Pathology:Reviewed     Observations:  Performance Status: ECOG 1  Depression Status: PHQ-9 Total Score: 0 (3/1/2022 11:58 AM)          Assessment & Plan:  Dr Kvng Carolina Notes:  -----------------------  August 18, 2017, I did spend time with Ms. Espinoza and her son and daughter. She is an extremely pleasant lady, patient of Dr. Donya Culp. Overall, she has been blessed with good health. Only history of hypertension and in the past has had tubal ligation and surgery for cataract. She has been getting mammograms regularly, as well as colonoscopy. Last colonoscopy 2016 per Dr. Jeannette Phipps. In 2016 her mammogram was unremarkable, but June 2017, changes of new architectural distortion in the left breast. That led to ultrasound and finally biopsy. Lesion was read to be 1.8 cm. There was an enlarged node in the left axillary area, but the biopsy from the axillary node was negative. Biopsy from the left breast revealed invasive ductal carcinoma, negative for presence of DCIS, LCIS, or lymphovascular invasion. Tumor was negative for ER, MD, and HER-2. Initial chest x-ray was negative. Denied any bony pains at that time. CBC and chemistries apparently were unremarkable, creatinine being 1.4. July 25, 2017, did undergo lumpectomy and sentinel node biopsy.  One out of two sentinel nodes were positive. No additional nodes were removed. The lumpectomy specimen reveals primary tumor to be 2.2 cm, high-grade, negative margins. August 18, 2017, we did talk about definite need for adjuvant chemotherapy followed by radiation therapy treatments. She did see Dr. Ashely Goldman the same day. She did undergo CT scan of the chest, abdomen, and pelvis as well as head on August 21, 2017, and those were negative. August 31, 2017, she was to start adjuvant therapy but we had trouble with the port that had to be repositioned by Dr. Marquis Welch on the other side. She did start A/C on September 7, 2017, received her fourth cycle on November 9, 2017. She tolerated those treatments with moderate degree of morbidity, but overall did fairly well. Had some issue with nausea, irritability, acid reflux, and skin discoloration. January 2, 2018, in general was doing fair, overall tolerating these treatments fairly well, some symptoms of weakness and tiredness. A little bit of peripheral numbness and tingling. Issue with acid reflux, was going to try Prilosec for the same. Also had issue with fresh red blood per rectum, was evaluated by Dr. Rony Moran and Dr. Corinne Forbes too. February 27, 2018: Started on weekly Taxol on November 30, 2017, finishing on February 23, 2018. She tolerated these treatments with significant symptoms of weakness and tiredness as well as muscle and joint discomfort and symptoms of neuropathy. February 27, 2018, I did spend time with her. I am hoping most of these symptoms of weakness, tiredness, neuropathy, and hyperpigmentation would improve with time. We talked about her having to see Dr. Ashely Goldman in March for radiation therapy treatments. Following the radiation therapy treatments we could discuss with her about the need for any additional testing, which she is anxious about. Mrs. Espinoza saw me on April 02, 2018, prior to that she had seen Dr. Ashely Goldman and was scheduled to start radiation therapy treatments in a couple of weeks. On the morning of April 2, 2018, she had seen Dr. Terry Bustos for removal of the Mediport and complained to him about swelling involving the left neck, arm, and had for two days duration, and Doppler done in his office had revealed changes of deep vein thrombosis. April 2, 2018, was started on treatment with Lovenox 60 milligrams q.12h., decreasing dosages because of elevated creatinine. She was admitted to the hospital on April 8, 2018, discharged on April 9, 2018, with acute gout involving the right ankle as well as cellulitis. Doppler in the hospital was negative for deep vein thrombosis in the right leg. She also had CT of the abdomen and pelvis on April 8, 2018, and that too was negative for any distinct masses. On April, 10, 2018, we talked about continuing on anticoagulation for 6 to 12 weeks, after that removal of the port but as the Lovenox was quite expensive, we did switch over to Xarelto. May 10, 2018, she was doing quite well with 20 milligrams of Xarelto per day tolerating it quite well, no issue with bleeding. She was undergoing radiation therapy treatments per Dr. Bronson Alvarez too. Finished receiving radiation therapy treatments on June 5, 2018    Had a nice visit with Susan Marcial for survivorship on August 15, 2018. July 18, 2019 did spend time with her. Overall she was doing well some symptoms of neuropathy. Also a few weeks history of dysphagia. Exam was fairly unremarkable. Blood work and tumor markers were drawn and advised her to have CT scans of the neck as well as chest as part of work-up for hoarseness of voice. She was also advised to see Dr. Juan Curran for the same. CT scan of the neck done on July 25, 2019 was basically negative for any distinct mass. CT scan of the chest done on July 25, 2019 2 was negative for any significant adenopathy. There was slight changes of cardiomegaly with minor atherosclerotic changes.  There was a 7 mm noncalcified right middle lobe pulmonary nodule etiology of that was unclear and repeat study in few months was recommended. July 9, 2020 overall she was doing quite well. Still some symptoms of neuropathy from earlier treatment but clinically no signs of recurrence. Regarding carcinoma the breast her plan being continuing to follow her progress through exams and mammograms. She has been getting her mammograms per her PCP as well as Dr. Carlene Caceres her gynecologist. Other issue has been a tiny 8 mm nodule in noted on the CT scan of the chest that had been stable but we will recheck her CAT scan again in 4 to 6months. We also been monitoring her tumor markers specially with aggressive nature of the disease. I did advise her that on her next visit it quite likely would be 1 of my partners who would be seeing her      Dr Jolene Flores Notes:  ------------------------  Left Breast cancer: Diagnosed in 8/18/2017 stage IIb, T2 N1 M0. Triple negative. Underwent lumpectomy followed by chemotherapy with Adriamycin, Cytoxan followed by Taxol. Completed adjuvant radiation. Feb 2022 ca 27-29 was 30.5  August 2021 mammogram normal  Will follow per NCCN guidelines. CKD: Creatinine 1.5. Avoid nephrotoxic medications and adequate hydration. Recommend follow up with Dr Huma Gomez. Lung nodule: Ct chest august 2021 with stable 0.8cm nodule, stable. Repeat CT chest in a year in august 2022     HTN: Maintain a log and discuss with PCP, Recommend weight loss, exercise and low salt diet. Continue the medical care. Discussed above findings and plan with her and she verbalized understanding. Answered all her questions. Discussed healthy lifestyle including weight loss, healthy diet and regular exercise as tolerated. Also discussed importance of being up-to-date with age-appropriate screening tools. Colonoscopy 6-7 years ago 2014 maybe, is not sure when repeat is recommended. Asked her to call Dr Ana Coleman.      Recommend follow-up with primary care physician and other specialist.    Return to clinic in august 2022  or earlier as needed    Erensto Mendoza

## 2022-08-22 ENCOUNTER — HOSPITAL ENCOUNTER (OUTPATIENT)
Dept: INFUSION THERAPY | Age: 77
Discharge: HOME | End: 2022-08-22
Payer: MEDICARE

## 2022-08-22 DIAGNOSIS — R91.1 NODULE OF RIGHT LUNG: ICD-10-CM

## 2022-08-22 DIAGNOSIS — C50.412 MALIGNANT NEOPLASM OF UPPER-OUTER QUADRANT OF LEFT FEMALE BREAST, UNSPECIFIED ESTROGEN RECEPTOR STATUS (HCC): ICD-10-CM

## 2022-08-22 LAB
ALBUMIN SERPL-MCNC: 4.4 GM/DL (ref 3.4–5)
ALP BLD-CCNC: 102 IU/L (ref 40–128)
ALT SERPL-CCNC: 16 U/L (ref 10–40)
ANION GAP SERPL CALCULATED.3IONS-SCNC: 11 MMOL/L (ref 4–16)
AST SERPL-CCNC: 21 IU/L (ref 15–37)
BASOPHILS ABSOLUTE: 0 K/CU MM
BASOPHILS RELATIVE PERCENT: 0.3 % (ref 0–1)
BILIRUB SERPL-MCNC: 0.8 MG/DL (ref 0–1)
BUN BLDV-MCNC: 26 MG/DL (ref 6–23)
CALCIUM SERPL-MCNC: 9.8 MG/DL (ref 8.3–10.6)
CHLORIDE BLD-SCNC: 107 MMOL/L (ref 99–110)
CO2: 23 MMOL/L (ref 21–32)
CREAT SERPL-MCNC: 1.4 MG/DL (ref 0.6–1.1)
DIFFERENTIAL TYPE: ABNORMAL
EOSINOPHILS ABSOLUTE: 0.2 K/CU MM
EOSINOPHILS RELATIVE PERCENT: 3.1 % (ref 0–3)
GFR AFRICAN AMERICAN: 44 ML/MIN/1.73M2
GFR NON-AFRICAN AMERICAN: 36 ML/MIN/1.73M2
GLUCOSE BLD-MCNC: 82 MG/DL (ref 70–99)
HCT VFR BLD CALC: 39.7 % (ref 37–47)
HEMOGLOBIN: 13.3 GM/DL (ref 12.5–16)
LYMPHOCYTES ABSOLUTE: 2.5 K/CU MM
LYMPHOCYTES RELATIVE PERCENT: 32.1 % (ref 24–44)
MCH RBC QN AUTO: 27.5 PG (ref 27–31)
MCHC RBC AUTO-ENTMCNC: 33.5 % (ref 32–36)
MCV RBC AUTO: 82 FL (ref 78–100)
MONOCYTES ABSOLUTE: 0.8 K/CU MM
MONOCYTES RELATIVE PERCENT: 9.9 % (ref 0–4)
PDW BLD-RTO: 18.2 % (ref 11.7–14.9)
PLATELET # BLD: 194 K/CU MM (ref 140–440)
PMV BLD AUTO: 11.1 FL (ref 7.5–11.1)
POTASSIUM SERPL-SCNC: 4.2 MMOL/L (ref 3.5–5.1)
RBC # BLD: 4.84 M/CU MM (ref 4.2–5.4)
SEGMENTED NEUTROPHILS ABSOLUTE COUNT: 4.3 K/CU MM
SEGMENTED NEUTROPHILS RELATIVE PERCENT: 54.6 % (ref 36–66)
SODIUM BLD-SCNC: 141 MMOL/L (ref 135–145)
TOTAL PROTEIN: 6.9 GM/DL (ref 6.4–8.2)
WBC # BLD: 7.8 K/CU MM (ref 4–10.5)

## 2022-08-22 PROCEDURE — 85025 COMPLETE CBC W/AUTO DIFF WBC: CPT

## 2022-08-22 PROCEDURE — 86300 IMMUNOASSAY TUMOR CA 15-3: CPT

## 2022-08-22 PROCEDURE — 36415 COLL VENOUS BLD VENIPUNCTURE: CPT

## 2022-08-22 PROCEDURE — 80053 COMPREHEN METABOLIC PANEL: CPT

## 2022-08-25 LAB — CA 27.29: 36.2 U/ML

## 2022-08-31 RX ORDER — GABAPENTIN 600 MG/1
600 TABLET ORAL 3 TIMES DAILY
Qty: 90 TABLET | Refills: 5 | Status: SHIPPED | OUTPATIENT
Start: 2022-08-31 | End: 2022-09-30

## 2022-09-02 ENCOUNTER — HOSPITAL ENCOUNTER (OUTPATIENT)
Dept: CT IMAGING | Age: 77
Discharge: HOME | End: 2022-09-02
Payer: MEDICARE

## 2022-09-02 DIAGNOSIS — R91.1 NODULE OF RIGHT LUNG: ICD-10-CM

## 2022-09-02 DIAGNOSIS — C50.412 MALIGNANT NEOPLASM OF UPPER-OUTER QUADRANT OF LEFT FEMALE BREAST, UNSPECIFIED ESTROGEN RECEPTOR STATUS (HCC): ICD-10-CM

## 2022-09-02 PROCEDURE — 71250 CT THORAX DX C-: CPT

## 2022-09-09 ENCOUNTER — OFFICE VISIT (OUTPATIENT)
Dept: ONCOLOGY | Age: 77
End: 2022-09-09
Payer: MEDICARE

## 2022-09-09 ENCOUNTER — HOSPITAL ENCOUNTER (OUTPATIENT)
Dept: INFUSION THERAPY | Age: 77
Discharge: HOME | End: 2022-09-09
Payer: MEDICARE

## 2022-09-09 VITALS
BODY MASS INDEX: 31.5 KG/M2 | SYSTOLIC BLOOD PRESSURE: 161 MMHG | DIASTOLIC BLOOD PRESSURE: 82 MMHG | WEIGHT: 177.8 LBS | TEMPERATURE: 97 F | OXYGEN SATURATION: 97 % | RESPIRATION RATE: 16 BRPM | HEART RATE: 73 BPM | HEIGHT: 63 IN

## 2022-09-09 DIAGNOSIS — Z12.31 SCREENING MAMMOGRAM, ENCOUNTER FOR: ICD-10-CM

## 2022-09-09 DIAGNOSIS — N28.9 KIDNEY FUNCTION ABNORMAL: ICD-10-CM

## 2022-09-09 DIAGNOSIS — R91.1 NODULE OF RIGHT LUNG: ICD-10-CM

## 2022-09-09 DIAGNOSIS — C50.412 MALIGNANT NEOPLASM OF UPPER-OUTER QUADRANT OF LEFT FEMALE BREAST, UNSPECIFIED ESTROGEN RECEPTOR STATUS (HCC): Primary | ICD-10-CM

## 2022-09-09 PROCEDURE — 1090F PRES/ABSN URINE INCON ASSESS: CPT | Performed by: INTERNAL MEDICINE

## 2022-09-09 PROCEDURE — 99214 OFFICE O/P EST MOD 30 MIN: CPT | Performed by: INTERNAL MEDICINE

## 2022-09-09 PROCEDURE — G8427 DOCREV CUR MEDS BY ELIG CLIN: HCPCS | Performed by: INTERNAL MEDICINE

## 2022-09-09 PROCEDURE — 99211 OFF/OP EST MAY X REQ PHY/QHP: CPT

## 2022-09-09 PROCEDURE — 1123F ACP DISCUSS/DSCN MKR DOCD: CPT | Performed by: INTERNAL MEDICINE

## 2022-09-09 PROCEDURE — G8400 PT W/DXA NO RESULTS DOC: HCPCS | Performed by: INTERNAL MEDICINE

## 2022-09-09 PROCEDURE — 1036F TOBACCO NON-USER: CPT | Performed by: INTERNAL MEDICINE

## 2022-09-09 PROCEDURE — G8417 CALC BMI ABV UP PARAM F/U: HCPCS | Performed by: INTERNAL MEDICINE

## 2022-09-09 ASSESSMENT — PATIENT HEALTH QUESTIONNAIRE - PHQ9
SUM OF ALL RESPONSES TO PHQ QUESTIONS 1-9: 0
SUM OF ALL RESPONSES TO PHQ QUESTIONS 1-9: 0
SUM OF ALL RESPONSES TO PHQ9 QUESTIONS 1 & 2: 0
2. FEELING DOWN, DEPRESSED OR HOPELESS: 0
1. LITTLE INTEREST OR PLEASURE IN DOING THINGS: 0
SUM OF ALL RESPONSES TO PHQ QUESTIONS 1-9: 0
SUM OF ALL RESPONSES TO PHQ QUESTIONS 1-9: 0

## 2022-09-09 NOTE — PROGRESS NOTES
MA Rooming Questions  Patient: Marie Lo  MRN: [id number]    Date: 9/9/2022        1. Do you have any new issues?   no         2. Do you need any refills on medications?    no    3. Have you had any imaging done since your last visit? yes - CT    4. Have you been hospitalized or seen in the emergency room since your last visit here?   no    5. Did the patient have a depression screening completed today?  Yes    PHQ-9 Total Score: 0 (9/9/2022  9:31 AM)       PHQ-9 Given to (if applicable):               PHQ-9 Score (if applicable):                     [] Positive     []  Negative              Does question #9 need addressed (if applicable)                     [] Yes    []  No               Edilberto Hylton CMA

## 2022-09-09 NOTE — PROGRESS NOTES
Patient Name: Kingsley Fatima  Patient : 1945  Patient MRN: [id number]     Primary Oncologist: Dominga Mallory MD  Referring Physician: SURESH Lozada [address]       Date of Service: 2022      Chief Complaint:   Chief Complaint   Patient presents with    Discuss Labs        Active Problem list  1. Malignant neoplasm of upper-outer quadrant of left female breast, unspecified estrogen receptor status ([facility] [address]    2. Nodule of right lung    3. Kidney function abnormal    4. Screening mammogram, encounter for    Primary malignant neoplasm of female breast (disorder) ( Stage Date: 2017, Stage IIB (Primary, Left breast upper-outer quadrant, T2, pN1a, M0)  Date of Dx:2017 Disease State: Stable disease; Histopathologic Type: Ductal invasive carcinoma; Histologic Grade-Michelle: G3; ER Status: Negative; NE Status: Negative; Androgen Receptor Status: Unknown; HER-2/michi Value-FISH: Negative; Menopausal Status: Postmenopausal; First record:2017 Last record:2017 Other Migrated ICD10: C50.412 )         HPI:   Dr Katt Sheriff Notes:  ------------------------       Ms. Espinoza underwent routine mammogram on 2017, and was noted to have new asymmetry involving the left breast at 2 o'clock position. Additional studies were recommended including ultrasound on 2017, showing highly suspicious left breast mass, correlating on mammographic area, also abnormal left axillary lymph node was suspicious for metastatic disease. 2017, she did undergo ultrasound-guided biopsy of the left breast mass as well as the left axillary mass. Biopsy from the left breast revealing infiltrating ductal carcinoma. Preliminary biopsy from the lymph node was read as possible metastatic disease, but final pathology was negative for disease involving axillary node. The breast lesion was infiltrating ductal, grade 2, negative for presence of DCIS, LCIS, or lymphovascular invasion.  ER, NE, HER2 were negative. Chest x-ray July 7, 2017, was negative. She saw me on July 21, 2017. Prior to that, she had been scheduled to undergo surgery per Dr. Morgan Gomes on July 25, 2017. On July 21, we did talk about the role for neoadjuvant chemotherapy, especially with triple-negative disease. Her tumor was felt to be less than 2 cm and it was felt that maybe the nodes were negative. She already was going to have surgery in the form of lumpectomy and axillary treasure sampling. She did undergo surgery on July 25, 2017, in the form of left lumpectomy and sentinel node biopsy, as well as placement of a port. She tolerated that procedure quite well. Pathology from the specimen revealed invasive ductal carcinoma, grade 3, 2.2 cm in greatest dimension, presence of ductal carcinoma in situ, and one out of two nodes being positive for metastatic disease, 9 mm in greatest dimension. Recovered well from surgery. CA 27-29 level on August 18, 2017, was 25.8. CT scan of the chest, abdomen, and pelvis August 21, 2017, was negative for any metastases. CT scan of the head too was negative for any metastasis. After receiving information about adjuvant chemotherapy treatments, she was also seen by Dr. Yony Figueroa on August 18, 2017, and decision was to proceed with chemotherapy treatments first and then go on to radiation therapy treatments. She was to start her first cycle of treatment on August 31, 2017, but we had trouble accessing the port and she was evaluated by IR and she was noted to have the tip of the Mediport projecting into the right internal jugular vein. The catheter demonstrated 360 degree loop. Subsequently, she saw Dr. Morgan Gomes again and the port was inserted on the other side and she was able to start her treatment with Adriamycin and Cytoxan on September 7, 2017.  She did of course receive Kytril, Emend, olanzapine, and dexamethasone as well as Neulasta  Saw me on November 27, 2017, had received her fourth cycle of treatment with A/C on November 9, 2017, with moderate symptoms of weakness and tiredness, some nausea, some epigastric discomfort, as well as hyperpigmentation, but overall she was still able to do quite q few of her day to day activities fairly well. Started on weekly Taxol on November 30, 2017, and finished on February 23, 2018. She tolerated these treatments with a moderate degree of morbidity, significant weakness, tiredness, hair loss, as well as skin hyperpigmentation. She subsequently did see Dr. Adrianna Rosario and arrangements were made for her to start radiation therapy treatments in mid April 2018. She saw me on April 02, 2018, still was having symptoms of weakness, tiredness, and peripheral neuropathy. She also had seen Dr. Danuta Thomson the morning of April 2, 2018, for removal of Mediport but had complained to him about significant swelling involving the left arm as well as neck area which had come up in a couple of days and Doppler done at his office had revealed changes of deep vein thrombosis. After removal of the port she came to see me in the office. April 2, 2018, she was started on treatment with Lovenox 60 milligrams q.12, decreasing dose because of mild elevation of creatinine. The plan was to possibly treat her for 6 to 12 weeks and hold off further Lovenox and maybe keep her on aspirin. DVT was felt to be provoked. She was admitted to the hospital on April 8, 2018, and discharged on April 9, 2018. She was admitted with symptoms of cellulitis in the right leg as well as possible acute gout. Ultrasound of the right leg was negative for any deep vein thrombosis and CT scan of the abdomen and pelvis was negative for any distinct masses. She was treated with steroids and antibiotics and discharged. When seen in the office on April 10, 2018, she was doing quite well, was doing much better as far as the discomfort involving the leg.  She stated that she still had symptoms of neuropathy but did not feel that she needed to take gabapentin. She also complained of Lovenox being too expensive and wanted to switch over to Xarelto. Visit here on May 10, 2018, was being treated with Xarelto 20 milligrams daily, tolerating it quite well. The swelling in the leg had improved and she had not noticed any bleeding anywhere. She was undergoing radiation therapy treatments and so far had been tolerating it fairly well. She was seen in the office on July 10, 2018, had finished receiving radiation therapy treatments with mild degree of mobility, had recovered fairly well. Major issue still was symptoms of peripheral neuropathy poorly controlled with gabapentin 600 mg twice a day. If it was not for neuropathy she in general would be feeling quite well. She was finished receiving treatments with Lovenox. After finishing above-mentioned treatments she did see Marysol Mallory on August 15, 2018 for survivorship visit. She was seen in the office on September 11, 2018 was doing quite well stated had a mammogram per Dr. Jaky Munguia late July 2018 and that was unremarkable. She did undergo bone scan per Dr. Jaky Munguia on January 23, 2019 for muscle and joint discomfort study was unremarkable. She was seen in the office on March 12, 2019. Still was having symptoms of peripheral neuropathy was taking gabapentin 600 mg twice a day did encourage her to take maybe 3 times a day. July 18, 2019 did spend time with her. Overall she was doing well some symptoms of neuropathy. Also a few weeks history of dysphagia. Exam was fairly unremarkable. Blood work and tumor markers were drawn and advised her to have CT scans of the neck as well as chest as part of work-up for hoarseness of voice. She was also advised to see Dr. Karma Vergara for the same. CT scan of the neck done on July 25, 2019 was basically negative for any distinct mass.  CT scan of the chest done on July 25, 2019 2 was negative for any significant adenopathy. There was slight changes of cardiomegaly with minor atherosclerotic changes. There was a 7 mm noncalcified right middle lobe pulmonary nodule etiology of that was unclear and repeat study in few months was recommended. CT scan of the chest done on November 7, 2019 revealed stable 8 mm noncalcified right middle lobe subpleural nodule compared to the CT scan done on July 25, 2019. Still somewhat concerning as it was felt to be new since earlier CAT scan in 2018. CT scan of the chest done on March 5, 2020 again revealed stable 8 mm right middle lobe pulmonary nodule continued surveillance was recommended. July 9, 2020 she was seen in the office in general was doing quite well still symptoms of neuropathy gabapentin still partially helpful otherwise in decent spirits managing her affairs enjoying her family and grandchildren. Still was being followed by her primary care physician as well as Dr. Richa Berry getting mammograms through them. Past medical problems include hypertension. Past surgical history includes tubal ligation and cataract involving both eyes. Dr Agatha Horton Notes:  -----------------------  8/18/20: Mammogram with no suspicious lesions in either breast. BIRADS cat 2    2/15/21; Ct chest:  1. Unchanged 0.8 cm solid nodule in the right middle lobe. Stability is   reassuring, although absence on 04/08/2018 suggests the possibility of   primary or secondary malignancy. Recommend continued attention on follow-up   imaging with consideration for PET/CT or percutaneous biopsy. Of note, the   Fleischner Society recommendations for solid pulmonary nodules do not apply   in the setting of prior malignancy. 2. Changes of left partial mastectomy with no findings of disease recurrence. 3. Subtle new patchy peribronchovascular and subpleural groundglass near the   lung bases likely due to an infectious or inflammatory process, potentially   including atypical pneumonia.      8/2021: Ct chest:  1. No acute intrathoracic abnormality. 2. 8 mm pleural based nodule in the right middle lobe is unchanged. Continued attention at follow-up is recommended. August 2021 mammogram was done and she is unaware of the results    Sep 2022:  Stable 8 mm subpleural right middle lobe pulmonary nodule. Given stability   since 07/25/2019 this may represent a benign nodule or previously treated   disease. Recommend follow-up as per oncologic protocol. No new or enlarging pulmonary nodules. No definite evidence of metastatic   disease. Augut 2022 mammogram normal    Interval History  9/9/2022:Arrived alone to the clinic today. Overall feels fine. Neuropathy sometimes is worse. Gabapentine is not helping. Occasionally dropping things. No breast masses, nipple discharge, erythema. No fever, cough. Appetite is good and denied any weight loss. No chest pain, increased sob, palpitations or  Any dizziness. No abdominal pain. No nausea, emesis, diarrhea or any constipation. No  sx. No LE edema bilaterally. No HA, vision changes or any focal weakness. Review of Systems   Per interval history; otherwise 10 point ROS is negative              Vital Signs:  BP (!) 161/82 (Site: Right Upper Arm, Position: Sitting, Cuff Size: Large Adult)   Pulse 73   Temp 97 °F (36.1 °C) (Infrared)   Resp 16   Ht 5' 3\" (1.6 m)   Wt 177 lb 12.8 oz (80.6 kg)   SpO2 97%   BMI 31.50 kg/m²     Physical Exam:    CONSTITUTIONAL: aao x 3, no distress, overweight/obese   EYES: No palor or any icetrus  ENT:ATNC  NECK:no JVD  HEMATOLOGIC/LYMPHATIC: no cervical, supraclavicular or axillary lymphadenopathy   LUNGS: CTAB  CARDIOVASCULAR: s1s2 rrr  ABDOMEN: soft ntnd bs pos no hsm  NEUROLOGIC:GI  SKIN: No rash  EXTREMITIES: no LE edema bilaterally  Breast: left lumpectomy scar intact, mild skin radiation changes, underlying fibrosis, did not really feel a discrete mass , no axillary lad .  Rt breast normal. Labs:    Hematology:  Lab Results   Component Value Date    WBC 7.8 08/22/2022    RBC 4.84 08/22/2022    HGB 13.3 08/22/2022    HCT 39.7 08/22/2022    MCV 82.0 08/22/2022    MCH 27.5 08/22/2022    MCHC 33.5 08/22/2022    RDW 18.2 (H) 08/22/2022     08/22/2022    MPV 11.1 08/22/2022    SEGSPCT 54.6 08/22/2022    EOSRELPCT 3.1 (H) 08/22/2022    BASOPCT 0.3 08/22/2022    LYMPHOPCT 32.1 08/22/2022    MONOPCT 9.9 (H) 08/22/2022    SEGSABS 4.3 08/22/2022    EOSABS 0.2 08/22/2022    BASOSABS 0.0 08/22/2022    LYMPHSABS 2.5 08/22/2022    MONOSABS 0.8 08/22/2022    DIFFTYPE AUTOMATED DIFFERENTIAL 08/22/2022     Lab Results   Component Value Date    ESR 33 (H) 04/08/2018       Chemistry:  Lab Results   Component Value Date     08/22/2022    K 4.2 08/22/2022     08/22/2022    CO2 23 08/22/2022    BUN 26 (H) 08/22/2022    CREATININE 1.4 (H) 08/22/2022    GLUCOSE 82 08/22/2022    CALCIUM 9.8 08/22/2022    PROT 6.9 08/22/2022    LABALBU 4.4 08/22/2022    BILITOT 0.8 08/22/2022    ALKPHOS 102 08/22/2022    AST 21 08/22/2022    ALT 16 08/22/2022    LABGLOM 36 (L) 08/22/2022    GFRAA 44 (L) 08/22/2022    PHOS 3.1 11/29/2021    MG 2.3 11/29/2021     No results found for: MMA, LDH, HOMOCYSTEINE  No components found for: LD  Lab Results   Component Value Date    TSHHS 1.950 11/29/2021       Immunology:  Lab Results   Component Value Date    PROT 6.9 08/22/2022    SPEP  11/29/2021     INTERPRETATION - Within normal limits. Fabiola Orr MD    SPEP INTERPRETATION - Albuminuria.   Fabiola Orr MD 11/29/2021    ALBUMINELP 3.7 11/29/2021    LABALPH 0.3 11/29/2021    LABALPH 0.7 11/29/2021    LABBETA 1.1 11/29/2021    GAMGLOB 1.3 11/29/2021     No results found for: MALU Marie  No results found for: B2M    Coagulation Panel:  Lab Results   Component Value Date    PROTIME 12.2 04/08/2018    INR 1.05 04/08/2018       Anemia Panel:  No results found for: Stevan Daly, FOLATE    Tumor Markers:  Lab Results Component Value Date     11 03/05/2020      03/05/2020     (NOTE)  INTERPRETIVE INFORMATION: Cancer Antigen 125  The Roche  electrochemiluminescent immunoassay was used. Results obtained with different test methods or kits cannot be   used interchangeably. The  test is used as an aid in   monitoring response to therapy for patients with epithelial   ovarian cancer. Serial testing for patient  values should be   used in conjunction with other clinical methods for monitoring   ovarian cancer. Patients with confirmed ovarian carcinoma may have   pretreatment  values in the same range as healthy   individuals. Elevations may be observed in patients with   nonmalignant disease. Therefore, a  value, regardless of   level, should not be interpreted as absolute evidence of the   presence or absence of malignant disease. Performed by [facility] , [id number] [phone]  www. Minnie Israel MD, Lab. Director      Candido Henderson 36.2 08/22/2022         Imaging: Reviewed     Pathology:Reviewed     Observations:  Performance Status: ECOG 1  Depression Status: PHQ-9 Total Score: 0 (9/9/2022  9:31 AM)          Assessment & Plan:  Dr Norberto Louie Notes:  -----------------------  August 18, 2017, I did spend time with Ms. Espinoza and her son and daughter. She is an extremely pleasant lady, patient of Dr. Lola Cao. Overall, she has been blessed with good health. Only history of hypertension and in the past has had tubal ligation and surgery for cataract. She has been getting mammograms regularly, as well as colonoscopy. Last colonoscopy 2016 per Dr. Clarissa Cortez. In 2016 her mammogram was unremarkable, but June 2017, changes of new architectural distortion in the left breast. That led to ultrasound and finally biopsy. Lesion was read to be 1.8 cm. There was an enlarged node in the left axillary area, but the biopsy from the axillary node was negative.  Biopsy from the left breast revealed invasive ductal carcinoma, negative for presence of DCIS, LCIS, or lymphovascular invasion. Tumor was negative for ER, [place], and HER-2. Initial chest x-ray was negative. Denied any bony pains at that time. CBC and chemistries apparently were unremarkable, creatinine being 1.4. July 25, 2017, did undergo lumpectomy and sentinel node biopsy. One out of two sentinel nodes were positive. No additional nodes were removed. The lumpectomy specimen reveals primary tumor to be 2.2 cm, high-grade, negative margins. August 18, 2017, we did talk about definite need for adjuvant chemotherapy followed by radiation therapy treatments. She did see Dr. Gilma Emmanuel the same day. She did undergo CT scan of the chest, abdomen, and pelvis as well as head on August 21, 2017, and those were negative. August 31, 2017, she was to start adjuvant therapy but we had trouble with the port that had to be repositioned by Dr. Neftali Olmedo on the other side. She did start A/C on September 7, 2017, received her fourth cycle on November 9, 2017. She tolerated those treatments with moderate degree of morbidity, but overall did fairly well. Had some issue with nausea, irritability, acid reflux, and skin discoloration. January 2, 2018, in general was doing fair, overall tolerating these treatments fairly well, some symptoms of weakness and tiredness. A little bit of peripheral numbness and tingling. Issue with acid reflux, was going to try Prilosec for the same. Also had issue with fresh red blood per rectum, was evaluated by Dr. Alla Vaughn and Dr. Gualberto Latham too. February 27, 2018: Started on weekly Taxol on November 30, 2017, finishing on February 23, 2018. She tolerated these treatments with significant symptoms of weakness and tiredness as well as muscle and joint discomfort and symptoms of neuropathy. February 27, 2018, I did spend time with her.  I am hoping most of these symptoms of weakness, tiredness, neuropathy, and hyperpigmentation would improve with time. We talked about her having to see Dr. Sigrid Graf in March for radiation therapy treatments. Following the radiation therapy treatments we could discuss with her about the need for any additional testing, which she is anxious about. Mrs. Espinoza saw me on April 02, 2018, prior to that she had seen Dr. Sigrid Graf and was scheduled to start radiation therapy treatments in a couple of weeks. On the morning of April 2, 2018, she had seen Dr. Emily Fernando for removal of the Mediport and complained to him about swelling involving the left neck, arm, and had for two days duration, and Doppler done in his office had revealed changes of deep vein thrombosis. April 2, 2018, was started on treatment with Lovenox 60 milligrams q.12h., decreasing dosages because of elevated creatinine. She was admitted to the hospital on April 8, 2018, discharged on April 9, 2018, with acute gout involving the right ankle as well as cellulitis. Doppler in the hospital was negative for deep vein thrombosis in the right leg. She also had CT of the abdomen and pelvis on April 8, 2018, and that too was negative for any distinct masses. On April, 10, 2018, we talked about continuing on anticoagulation for 6 to 12 weeks, after that removal of the port but as the Lovenox was quite expensive, we did switch over to Xarelto. May 10, 2018, she was doing quite well with 20 milligrams of Xarelto per day tolerating it quite well, no issue with bleeding. She was undergoing radiation therapy treatments per Dr. Sigrid Graf too. Finished receiving radiation therapy treatments on June 5, 2018    Had a nice visit with Suzi Reane for survivorship on August 15, 2018. July 18, 2019 did spend time with her. Overall she was doing well some symptoms of neuropathy. Also a few weeks history of dysphagia. Exam was fairly unremarkable.  Blood work and tumor markers were drawn and advised her to have CT scans of the neck as well as chest as part of work-up for hoarseness of voice. She was also advised to see Dr. Norberto Melchor for the same. CT scan of the neck done on July 25, 2019 was basically negative for any distinct mass. CT scan of the chest done on July 25, 2019 2 was negative for any significant adenopathy. There was slight changes of cardiomegaly with minor atherosclerotic changes. There was a 7 mm noncalcified right middle lobe pulmonary nodule etiology of that was unclear and repeat study in few months was recommended. July 9, 2020 overall she was doing quite well. Still some symptoms of neuropathy from earlier treatment but clinically no signs of recurrence. Regarding carcinoma the breast her plan being continuing to follow her progress through exams and mammograms. She has been getting her mammograms per her PCP as well as Dr. Hanna Booker her gynecologist. Other issue has been a tiny 8 mm nodule in noted on the CT scan of the chest that had been stable but we will recheck her CAT scan again in 4 to 6months. We also been monitoring her tumor markers specially with aggressive nature of the disease. I did advise her that on her next visit it quite likely would be 1 of my partners who would be seeing her      Dr Sy Coughlin Notes:  ------------------------  Left Breast cancer: Diagnosed in 8/18/2017 stage IIb, T2 N1 M0. Triple negative. Underwent lumpectomy followed by chemotherapy with Adriamycin, Cytoxan followed by Taxol. Completed adjuvant radiation. 8/22/22: Ca 27-29 was 36  August 2022 mammogram normal  Will follow per NCCN guidelines. CKD: Creatinine 1.4. Avoid nephrotoxic medications and adequate hydration. Recommend follow up with Dr Jasiel Lovell. Lung nodule: Ct chest  with stable 0.8cm nodule in sep 2022. Will repeat in 2 years as it has been stable since 2019    HTN: Maintain a log and discuss with PCP, Recommend weight loss, exercise and low salt diet. Neuropathy: Is stable. Is on Neurontin. Discussed epsom salt soaoks and also Lyrica    Continue the medical care. Discussed above findings and plan with her and she verbalized understanding. Answered all her questions. Discussed healthy lifestyle including weight loss, healthy diet and regular exercise as tolerated. Also discussed importance of being up-to-date with age-appropriate screening tools. Colonoscopy 6-7 years ago 2014 maybe, is not sure when repeat is recommended. Asked her to call Dr Ariane Cao.      Recommend follow-up with primary care physician and other specialist.    Return to clinic in march 2023 or earlier as needed    ARNOL

## 2022-11-30 ENCOUNTER — HOSPITAL ENCOUNTER (OUTPATIENT)
Age: 77
Discharge: HOME | End: 2022-11-30
Payer: MEDICARE

## 2022-11-30 DIAGNOSIS — I10 PRIMARY HYPERTENSION: ICD-10-CM

## 2022-11-30 DIAGNOSIS — M54.42 CHRONIC BILATERAL LOW BACK PAIN WITH LEFT-SIDED SCIATICA: ICD-10-CM

## 2022-11-30 DIAGNOSIS — N18.32 STAGE 3B CHRONIC KIDNEY DISEASE (HCC): ICD-10-CM

## 2022-11-30 DIAGNOSIS — G89.29 CHRONIC BILATERAL LOW BACK PAIN WITH LEFT-SIDED SCIATICA: ICD-10-CM

## 2022-11-30 DIAGNOSIS — M1A.49X0 OTHER SECONDARY CHRONIC GOUT OF MULTIPLE SITES WITHOUT TOPHUS: ICD-10-CM

## 2022-11-30 DIAGNOSIS — C50.412 MALIGNANT NEOPLASM OF UPPER-OUTER QUADRANT OF LEFT FEMALE BREAST, UNSPECIFIED ESTROGEN RECEPTOR STATUS (HCC): ICD-10-CM

## 2022-11-30 LAB
ALBUMIN SERPL-MCNC: 4 GM/DL (ref 3.4–5)
ANION GAP SERPL CALCULATED.3IONS-SCNC: 10 MMOL/L (ref 4–16)
BACTERIA: NEGATIVE /HPF
BILIRUBIN URINE: NEGATIVE MG/DL
BLOOD, URINE: ABNORMAL
BUN BLDV-MCNC: 21 MG/DL (ref 6–23)
CALCIUM SERPL-MCNC: 9.5 MG/DL (ref 8.3–10.6)
CHLORIDE BLD-SCNC: 107 MMOL/L (ref 99–110)
CLARITY: ABNORMAL
CO2: 25 MMOL/L (ref 21–32)
COLOR: YELLOW
CREAT SERPL-MCNC: 1.3 MG/DL (ref 0.6–1.1)
CREATININE URINE: 89.9 MG/DL (ref 28–217)
GFR SERPL CREATININE-BSD FRML MDRD: 42 ML/MIN/1.73M2
GLUCOSE BLD-MCNC: 87 MG/DL (ref 70–99)
GLUCOSE, URINE: NEGATIVE MG/DL
KETONES, URINE: NEGATIVE MG/DL
LEUKOCYTE ESTERASE, URINE: ABNORMAL
MAGNESIUM: 2 MG/DL (ref 1.8–2.4)
MUCUS: ABNORMAL HPF
NITRITE URINE, QUANTITATIVE: NEGATIVE
PH, URINE: 6 (ref 5–8)
PHOSPHORUS: 3.4 MG/DL (ref 2.5–4.9)
POTASSIUM SERPL-SCNC: 4.3 MMOL/L (ref 3.5–5.1)
PROT/CREAT RATIO, UR: 0.1
PROTEIN UA: NEGATIVE MG/DL
RBC URINE: <1 /HPF (ref 0–6)
SODIUM BLD-SCNC: 142 MMOL/L (ref 135–145)
SPECIFIC GRAVITY UA: 1.02 (ref 1–1.03)
SQUAMOUS EPITHELIAL: 16 /HPF
TRANSITIONAL EPITHELIAL: 1 /HPF
TRICHOMONAS: ABNORMAL /HPF
URINE TOTAL PROTEIN: 13.3 MG/DL
UROBILINOGEN, URINE: 0.2 MG/DL (ref 0.2–1)
WBC UA: 12 /HPF (ref 0–5)

## 2022-11-30 PROCEDURE — 82040 ASSAY OF SERUM ALBUMIN: CPT

## 2022-11-30 PROCEDURE — 83735 ASSAY OF MAGNESIUM: CPT

## 2022-11-30 PROCEDURE — 82570 ASSAY OF URINE CREATININE: CPT

## 2022-11-30 PROCEDURE — 81001 URINALYSIS AUTO W/SCOPE: CPT

## 2022-11-30 PROCEDURE — 36415 COLL VENOUS BLD VENIPUNCTURE: CPT

## 2022-11-30 PROCEDURE — 80048 BASIC METABOLIC PNL TOTAL CA: CPT

## 2022-11-30 PROCEDURE — 84100 ASSAY OF PHOSPHORUS: CPT

## 2022-11-30 PROCEDURE — 84156 ASSAY OF PROTEIN URINE: CPT

## 2022-12-05 PROBLEM — N30.00 ACUTE CYSTITIS WITHOUT HEMATURIA: Status: ACTIVE | Noted: 2022-12-05

## 2022-12-05 PROBLEM — M19.90 ARTHRITIS: Status: ACTIVE | Noted: 2022-12-05

## 2023-03-03 ENCOUNTER — HOSPITAL ENCOUNTER (OUTPATIENT)
Dept: INFUSION THERAPY | Age: 78
Discharge: HOME | End: 2023-03-03
Payer: MEDICARE

## 2023-03-03 DIAGNOSIS — Z12.31 SCREENING MAMMOGRAM, ENCOUNTER FOR: ICD-10-CM

## 2023-03-03 DIAGNOSIS — R91.1 NODULE OF RIGHT LUNG: ICD-10-CM

## 2023-03-03 DIAGNOSIS — C50.412 MALIGNANT NEOPLASM OF UPPER-OUTER QUADRANT OF LEFT FEMALE BREAST, UNSPECIFIED ESTROGEN RECEPTOR STATUS (HCC): ICD-10-CM

## 2023-03-03 DIAGNOSIS — N28.9 KIDNEY FUNCTION ABNORMAL: ICD-10-CM

## 2023-03-03 LAB
ALBUMIN SERPL-MCNC: 4.1 GM/DL (ref 3.4–5)
ALP BLD-CCNC: 111 IU/L (ref 40–129)
ALT SERPL-CCNC: 15 U/L (ref 10–40)
ANION GAP SERPL CALCULATED.3IONS-SCNC: 9 MMOL/L (ref 4–16)
AST SERPL-CCNC: 21 IU/L (ref 15–37)
BASOPHILS ABSOLUTE: 0 K/CU MM
BASOPHILS RELATIVE PERCENT: 0.1 % (ref 0–1)
BILIRUB SERPL-MCNC: 0.5 MG/DL (ref 0–1)
BUN SERPL-MCNC: 26 MG/DL (ref 6–23)
CALCIUM SERPL-MCNC: 9.6 MG/DL (ref 8.3–10.6)
CHLORIDE BLD-SCNC: 104 MMOL/L (ref 99–110)
CO2: 28 MMOL/L (ref 21–32)
CREAT SERPL-MCNC: 1.4 MG/DL (ref 0.6–1.1)
DIFFERENTIAL TYPE: ABNORMAL
EOSINOPHILS ABSOLUTE: 0.2 K/CU MM
EOSINOPHILS RELATIVE PERCENT: 2.9 % (ref 0–3)
GFR SERPL CREATININE-BSD FRML MDRD: 38 ML/MIN/1.73M2
GLUCOSE SERPL-MCNC: 87 MG/DL (ref 70–99)
HCT VFR BLD CALC: 39.3 % (ref 37–47)
HEMOGLOBIN: 12.9 GM/DL (ref 12.5–16)
LYMPHOCYTES ABSOLUTE: 2.4 K/CU MM
LYMPHOCYTES RELATIVE PERCENT: 31.6 % (ref 24–44)
MCH RBC QN AUTO: 27.1 PG (ref 27–31)
MCHC RBC AUTO-ENTMCNC: 32.8 % (ref 32–36)
MCV RBC AUTO: 82.6 FL (ref 78–100)
MONOCYTES ABSOLUTE: 0.8 K/CU MM
MONOCYTES RELATIVE PERCENT: 10.6 % (ref 0–4)
PDW BLD-RTO: 18.4 % (ref 11.7–14.9)
PLATELET # BLD: 186 K/CU MM (ref 140–440)
PMV BLD AUTO: 11.6 FL (ref 7.5–11.1)
POTASSIUM SERPL-SCNC: 4.6 MMOL/L (ref 3.5–5.1)
RBC # BLD: 4.76 M/CU MM (ref 4.2–5.4)
SEGMENTED NEUTROPHILS ABSOLUTE COUNT: 4.1 K/CU MM
SEGMENTED NEUTROPHILS RELATIVE PERCENT: 54.8 % (ref 36–66)
SODIUM BLD-SCNC: 141 MMOL/L (ref 135–145)
TOTAL PROTEIN: 6.8 GM/DL (ref 6.4–8.2)
WBC # BLD: 7.6 K/CU MM (ref 4–10.5)

## 2023-03-03 PROCEDURE — 36415 COLL VENOUS BLD VENIPUNCTURE: CPT

## 2023-03-03 PROCEDURE — 80053 COMPREHEN METABOLIC PANEL: CPT

## 2023-03-03 PROCEDURE — 85025 COMPLETE CBC W/AUTO DIFF WBC: CPT

## 2023-03-03 PROCEDURE — 86300 IMMUNOASSAY TUMOR CA 15-3: CPT

## 2023-03-05 LAB — CANCER AG27-29 SERPL-ACNC: 31.2 U/ML

## 2023-03-09 NOTE — PROGRESS NOTES
Patient Name: Deangelo Khan  Patient : 1945  Patient MRN: [id number]     Primary Oncologist: Elizabeth Johnson MD  Referring Physician: SURESH Wright [address]       Date of Service: 3/10/2023      Chief Complaint:   Chief Complaint   Patient presents with    Follow-up          Active Problem list  1. Malignant neoplasm of upper-outer quadrant of left female breast, unspecified estrogen receptor status ([facility] [address]    2. Nodule of right lung    3. Kidney function abnormal      Primary malignant neoplasm of female breast (disorder) ( Stage Date: 2017, Stage IIB (Primary, Left breast upper-outer quadrant, T2, pN1a, M0)  Date of Dx:2017 Disease State: Stable disease; Histopathologic Type: Ductal invasive carcinoma; Histologic Grade-Michelle: G3; ER Status: Negative; ID Status: Negative; Androgen Receptor Status: Unknown; HER-2/michi Value-FISH: Negative; Menopausal Status: Postmenopausal; First record:2017 Last record:2017 Other Migrated ICD10: C50.412 )         HPI:   Dr Azra Pedraza Notes:  ------------------------       Ms. Espinoza underwent routine mammogram on 2017, and was noted to have new asymmetry involving the left breast at 2 o'clock position. Additional studies were recommended including ultrasound on 2017, showing highly suspicious left breast mass, correlating on mammographic area, also abnormal left axillary lymph node was suspicious for metastatic disease. 2017, she did undergo ultrasound-guided biopsy of the left breast mass as well as the left axillary mass. Biopsy from the left breast revealing infiltrating ductal carcinoma. Preliminary biopsy from the lymph node was read as possible metastatic disease, but final pathology was negative for disease involving axillary node. The breast lesion was infiltrating ductal, grade 2, negative for presence of DCIS, LCIS, or lymphovascular invasion. ER, ID, HER2 were negative.    Chest x-ray July 7, 2017, was negative. She saw me on July 21, 2017. Prior to that, she had been scheduled to undergo surgery per Dr. Medina Crowell on July 25, 2017. On July 21, we did talk about the role for neoadjuvant chemotherapy, especially with triple-negative disease. Her tumor was felt to be less than 2 cm and it was felt that maybe the nodes were negative. She already was going to have surgery in the form of lumpectomy and axillary treasure sampling. She did undergo surgery on July 25, 2017, in the form of left lumpectomy and sentinel node biopsy, as well as placement of a port. She tolerated that procedure quite well. Pathology from the specimen revealed invasive ductal carcinoma, grade 3, 2.2 cm in greatest dimension, presence of ductal carcinoma in situ, and one out of two nodes being positive for metastatic disease, 9 mm in greatest dimension. Recovered well from surgery. CA 27-29 level on August 18, 2017, was 25.8. CT scan of the chest, abdomen, and pelvis August 21, 2017, was negative for any metastases. CT scan of the head too was negative for any metastasis. After receiving information about adjuvant chemotherapy treatments, she was also seen by Dr. Liseth Mcdowell on August 18, 2017, and decision was to proceed with chemotherapy treatments first and then go on to radiation therapy treatments. She was to start her first cycle of treatment on August 31, 2017, but we had trouble accessing the port and she was evaluated by IR and she was noted to have the tip of the Mediport projecting into the right internal jugular vein. The catheter demonstrated 360 degree loop. Subsequently, she saw Dr. Medina Crowell again and the port was inserted on the other side and she was able to start her treatment with Adriamycin and Cytoxan on September 7, 2017.  She did of course receive Kytril, Emend, olanzapine, and dexamethasone as well as Neulasta  Saw me on November 27, 2017, had received her fourth cycle of treatment with A/C on November 9, 2017, with moderate symptoms of weakness and tiredness, some nausea, some epigastric discomfort, as well as hyperpigmentation, but overall she was still able to do quite q few of her day to day activities fairly well. Started on weekly Taxol on November 30, 2017, and finished on February 23, 2018. She tolerated these treatments with a moderate degree of morbidity, significant weakness, tiredness, hair loss, as well as skin hyperpigmentation. She subsequently did see Dr. Rosana Sears and arrangements were made for her to start radiation therapy treatments in mid April 2018. She saw me on April 02, 2018, still was having symptoms of weakness, tiredness, and peripheral neuropathy. She also had seen Dr. Blanca Louis the morning of April 2, 2018, for removal of Mediport but had complained to him about significant swelling involving the left arm as well as neck area which had come up in a couple of days and Doppler done at his office had revealed changes of deep vein thrombosis. After removal of the port she came to see me in the office. April 2, 2018, she was started on treatment with Lovenox 60 milligrams q.12, decreasing dose because of mild elevation of creatinine. The plan was to possibly treat her for 6 to 12 weeks and hold off further Lovenox and maybe keep her on aspirin. DVT was felt to be provoked. She was admitted to the hospital on April 8, 2018, and discharged on April 9, 2018. She was admitted with symptoms of cellulitis in the right leg as well as possible acute gout. Ultrasound of the right leg was negative for any deep vein thrombosis and CT scan of the abdomen and pelvis was negative for any distinct masses. She was treated with steroids and antibiotics and discharged. When seen in the office on April 10, 2018, she was doing quite well, was doing much better as far as the discomfort involving the leg.  She stated that she still had symptoms of neuropathy but did not feel that she needed to take gabapentin. She also complained of Lovenox being too expensive and wanted to switch over to Xarelto. Visit here on May 10, 2018, was being treated with Xarelto 20 milligrams daily, tolerating it quite well. The swelling in the leg had improved and she had not noticed any bleeding anywhere. She was undergoing radiation therapy treatments and so far had been tolerating it fairly well. She was seen in the office on July 10, 2018, had finished receiving radiation therapy treatments with mild degree of mobility, had recovered fairly well. Major issue still was symptoms of peripheral neuropathy poorly controlled with gabapentin 600 mg twice a day. If it was not for neuropathy she in general would be feeling quite well. She was finished receiving treatments with Lovenox. After finishing above-mentioned treatments she did see Milena Choudhary on August 15, 2018 for survivorship visit. She was seen in the office on September 11, 2018 was doing quite well stated had a mammogram per Dr. Albertina Reza late July 2018 and that was unremarkable. She did undergo bone scan per Dr. Albertina Reza on January 23, 2019 for muscle and joint discomfort study was unremarkable. She was seen in the office on March 12, 2019. Still was having symptoms of peripheral neuropathy was taking gabapentin 600 mg twice a day did encourage her to take maybe 3 times a day. July 18, 2019 did spend time with her. Overall she was doing well some symptoms of neuropathy. Also a few weeks history of dysphagia. Exam was fairly unremarkable. Blood work and tumor markers were drawn and advised her to have CT scans of the neck as well as chest as part of work-up for hoarseness of voice. She was also advised to see Dr. Ezequiel Youssef for the same. CT scan of the neck done on July 25, 2019 was basically negative for any distinct mass. CT scan of the chest done on July 25, 2019 2 was negative for any significant adenopathy.  There was slight changes of cardiomegaly with minor atherosclerotic changes. There was a 7 mm noncalcified right middle lobe pulmonary nodule etiology of that was unclear and repeat study in few months was recommended.    CT scan of the chest done on November 7, 2019 revealed stable 8 mm noncalcified right middle lobe subpleural nodule compared to the CT scan done on July 25, 2019. Still somewhat concerning as it was felt to be new since earlier CAT scan in 2018.    CT scan of the chest done on March 5, 2020 again revealed stable 8 mm right middle lobe pulmonary nodule continued surveillance was recommended.    July 9, 2020 she was seen in the office in general was doing quite well still symptoms of neuropathy gabapentin still partially helpful otherwise in decent spirits managing her affairs enjoying her family and grandchildren.    Still was being followed by her primary care physician as well as Dr. Amos getting mammograms through them.    Past medical problems include hypertension.     Past surgical history includes tubal ligation and cataract involving both eyes.    Dr Rodriguez Notes:  -----------------------  8/18/20: Mammogram with no suspicious lesions in either breast. BIRADS cat 2    2/15/21; Ct chest:  1. Unchanged 0.8 cm solid nodule in the right middle lobe.  Stability is   reassuring, although absence on 04/08/2018 suggests the possibility of   primary or secondary malignancy.  Recommend continued attention on follow-up   imaging with consideration for PET/CT or percutaneous biopsy.  Of note, the   Fleischner Society recommendations for solid pulmonary nodules do not apply   in the setting of prior malignancy.   2. Changes of left partial mastectomy with no findings of disease recurrence.   3. Subtle new patchy peribronchovascular and subpleural groundglass near the   lung bases likely due to an infectious or inflammatory process, potentially   including atypical pneumonia.     8/2021: Ct chest:  1. No acute intrathoracic  abnormality. 2. 8 mm pleural based nodule in the right middle lobe is unchanged. Continued attention at follow-up is recommended. August 2021 mammogram was done and she is unaware of the results    Sep 2022: CT Chest  Stable 8 mm subpleural right middle lobe pulmonary nodule. Given stability   since 07/25/2019 this may represent a benign nodule or previously treated   disease. Recommend follow-up as per oncologic protocol. No new or enlarging pulmonary nodules. No definite evidence of metastatic   disease. August 2022 mammogram normal    1/25/2023 colonoscopy with Dr. Brendon Montez  2 polyps removed      3/3/2023 CBC WBC 7.6 hemoglobin 12.9 hematocrit 39.3 MCV 82.6 platelets 336 ANC 2808  CMP BUN 26 creatinine 1.4 GFR 38  CA 27.29 31.2    Interval History  3/10/2023: Arrived to the clinic today alone. Overall feeling good. No new masses. No CP. No increase SOB. No Dizziness. No ABD Pain. No N/V. Weight stable. No LE Edema. Reports Neuropathy. Reports intermittent dropping of things secondary to neuropathy. Review of Systems   Per interval history; otherwise 10 point ROS is negative              Vital Signs:  /63 (Site: Right Upper Arm, Position: Sitting, Cuff Size: Large Adult)   Pulse 94   Resp 18   Ht 5' 3\" (1.6 m)   Wt 177 lb (80.3 kg)   SpO2 95%   BMI 31.35 kg/m²     Physical Exam:    CONSTITUTIONAL: aao x 3, no distress, overweight/obese   EYES: No palor or any icetrus  ENT:ATNC  NECK:no JVD  HEMATOLOGIC/LYMPHATIC: no cervical, supraclavicular or axillary lymphadenopathy   LUNGS: CTAB  CARDIOVASCULAR: s1s2 rrr  ABDOMEN: soft ntnd bs pos no hsm  NEUROLOGIC:GI  SKIN: No rash  EXTREMITIES: no LE edema bilaterally  Breast: left lumpectomy scar intact, mild skin radiation changes, underlying fibrosis, did not really feel a discrete mass , no axillary lad .  Rt breast normal.     Labs:    Hematology:  Lab Results   Component Value Date    WBC 7.6 03/03/2023    RBC 4.76 03/03/2023 HGB 12.9 03/03/2023    HCT 39.3 03/03/2023    MCV 82.6 03/03/2023    MCH 27.1 03/03/2023    MCHC 32.8 03/03/2023    RDW 18.4 (H) 03/03/2023     03/03/2023    MPV 11.6 (H) 03/03/2023    SEGSPCT 54.8 03/03/2023    EOSRELPCT 2.9 03/03/2023    BASOPCT 0.1 03/03/2023    LYMPHOPCT 31.6 03/03/2023    MONOPCT 10.6 (H) 03/03/2023    SEGSABS 4.1 03/03/2023    EOSABS 0.2 03/03/2023    BASOSABS 0.0 03/03/2023    LYMPHSABS 2.4 03/03/2023    MONOSABS 0.8 03/03/2023    DIFFTYPE AUTOMATED DIFFERENTIAL 03/03/2023     Lab Results   Component Value Date    ESR 33 (H) 04/08/2018       Chemistry:  Lab Results   Component Value Date     03/03/2023    K 4.6 03/03/2023     03/03/2023    CO2 28 03/03/2023    BUN 26 (H) 03/03/2023    CREATININE 1.4 (H) 03/03/2023    GLUCOSE 87 03/03/2023    CALCIUM 9.6 03/03/2023    PROT 6.8 03/03/2023    LABALBU 4.1 03/03/2023    BILITOT 0.5 03/03/2023    ALKPHOS 111 03/03/2023    AST 21 03/03/2023    ALT 15 03/03/2023    LABGLOM 38 (L) 03/03/2023    GFRAA 44 (L) 08/22/2022    PHOS 3.4 11/30/2022    MG 2.0 11/30/2022     No results found for: MMA, LDH, HOMOCYSTEINE  No components found for: LD  Lab Results   Component Value Date    TSHHS 1.950 11/29/2021       Immunology:  Lab Results   Component Value Date    PROT 6.8 03/03/2023    SPEP  11/29/2021     INTERPRETATION - Within normal limits. Rachel Fraga MD    SPEP INTERPRETATION - Albuminuria.   Rachel Fraga MD 11/29/2021    ALBUMINELP 3.7 11/29/2021    LABALPH 0.3 11/29/2021    LABALPH 0.7 11/29/2021    LABBETA 1.1 11/29/2021    GAMGLOB 1.3 11/29/2021     No results found for: MALU [place]  No results found for: B2M    Coagulation Panel:  Lab Results   Component Value Date    PROTIME 12.2 04/08/2018    INR 1.05 04/08/2018       Anemia Panel:  No results found for: WBIZGSNU10, FOLATE    Tumor Markers:  Lab Results   Component Value Date     11 03/05/2020      03/05/2020     (NOTE)  INTERPRETIVE INFORMATION: Cancer Antigen 125  The Roche  electrochemiluminescent immunoassay was used. Results obtained with different test methods or kits cannot be   used interchangeably. The  test is used as an aid in   monitoring response to therapy for patients with epithelial   ovarian cancer. Serial testing for patient  values should be   used in conjunction with other clinical methods for monitoring   ovarian cancer. Patients with confirmed ovarian carcinoma may have   pretreatment  values in the same range as healthy   individuals. Elevations may be observed in patients with   nonmalignant disease. Therefore, a  value, regardless of   level, should not be interpreted as absolute evidence of the   presence or absence of malignant disease. Performed by [facility] [id number], [id number] [facility] [phone]  www. oDugie Boyce MD, Lab. Director      Sohan Wall 31.2 03/03/2023         Imaging: Reviewed     Pathology:Reviewed     Observations:  Performance Status: ECOG 1  Depression Status: PHQ-9 Total Score: 0 (3/10/2023  1:20 PM)          Assessment & Plan:  Dr Remy Flores Notes:  -----------------------  August 18, 2017, I did spend time with Ms. Espinoza and her son and daughter. She is an extremely pleasant lady, patient of Dr. Dawit Aviles. Overall, she has been blessed with good health. Only history of hypertension and in the past has had tubal ligation and surgery for cataract. She has been getting mammograms regularly, as well as colonoscopy. Last colonoscopy 2016 per Dr. Nini Araya. In 2016 her mammogram was unremarkable, but June 2017, changes of new architectural distortion in the left breast. That led to ultrasound and finally biopsy. Lesion was read to be 1.8 cm. There was an enlarged node in the left axillary area, but the biopsy from the axillary node was negative.  Biopsy from the left breast revealed invasive ductal carcinoma, negative for presence of DCIS, LCIS, or lymphovascular invasion. Tumor was negative for ER, [place], and HER-2. Initial chest x-ray was negative. Denied any bony pains at that time. CBC and chemistries apparently were unremarkable, creatinine being 1.4. July 25, 2017, did undergo lumpectomy and sentinel node biopsy. One out of two sentinel nodes were positive. No additional nodes were removed. The lumpectomy specimen reveals primary tumor to be 2.2 cm, high-grade, negative margins. August 18, 2017, we did talk about definite need for adjuvant chemotherapy followed by radiation therapy treatments. She did see Dr. Arnulfo Cee the same day. She did undergo CT scan of the chest, abdomen, and pelvis as well as head on August 21, 2017, and those were negative. August 31, 2017, she was to start adjuvant therapy but we had trouble with the port that had to be repositioned by Dr. Delia Muñiz on the other side. She did start A/C on September 7, 2017, received her fourth cycle on November 9, 2017. She tolerated those treatments with moderate degree of morbidity, but overall did fairly well. Had some issue with nausea, irritability, acid reflux, and skin discoloration. January 2, 2018, in general was doing fair, overall tolerating these treatments fairly well, some symptoms of weakness and tiredness. A little bit of peripheral numbness and tingling. Issue with acid reflux, was going to try Prilosec for the same. Also had issue with fresh red blood per rectum, was evaluated by Dr. Juana Fowler and Dr. Nini Araya too. February 27, 2018: Started on weekly Taxol on November 30, 2017, finishing on February 23, 2018. She tolerated these treatments with significant symptoms of weakness and tiredness as well as muscle and joint discomfort and symptoms of neuropathy. February 27, 2018, I did spend time with her. I am hoping most of these symptoms of weakness, tiredness, neuropathy, and hyperpigmentation would improve with time.  We talked about her having to see Dr. Arnulfo Cee in March for radiation therapy treatments. Following the radiation therapy treatments we could discuss with her about the need for any additional testing, which she is anxious about. Mrs. Espinoza saw me on April 02, 2018, prior to that she had seen Dr. Almas Preciado and was scheduled to start radiation therapy treatments in a couple of weeks. On the morning of April 2, 2018, she had seen Dr. Michel French for removal of the Mediport and complained to him about swelling involving the left neck, arm, and had for two days duration, and Doppler done in his office had revealed changes of deep vein thrombosis. April 2, 2018, was started on treatment with Lovenox 60 milligrams q.12h., decreasing dosages because of elevated creatinine. She was admitted to the hospital on April 8, 2018, discharged on April 9, 2018, with acute gout involving the right ankle as well as cellulitis. Doppler in the hospital was negative for deep vein thrombosis in the right leg. She also had CT of the abdomen and pelvis on April 8, 2018, and that too was negative for any distinct masses. On April, 10, 2018, we talked about continuing on anticoagulation for 6 to 12 weeks, after that removal of the port but as the Lovenox was quite expensive, we did switch over to Xarelto. May 10, 2018, she was doing quite well with 20 milligrams of Xarelto per day tolerating it quite well, no issue with bleeding. She was undergoing radiation therapy treatments per Dr. Almas Preciado too. Finished receiving radiation therapy treatments on June 5, 2018    Had a nice visit with Brandy Hernandez for survivorship on August 15, 2018. July 18, 2019 did spend time with her. Overall she was doing well some symptoms of neuropathy. Also a few weeks history of dysphagia. Exam was fairly unremarkable. Blood work and tumor markers were drawn and advised her to have CT scans of the neck as well as chest as part of work-up for hoarseness of voice.  She was also advised to see Dr. Lester Mar for the same.     CT scan of the neck done on July 25, 2019 was basically negative for any distinct mass. CT scan of the chest done on July 25, 2019 2 was negative for any significant adenopathy. There was slight changes of cardiomegaly with minor atherosclerotic changes. There was a 7 mm noncalcified right middle lobe pulmonary nodule etiology of that was unclear and repeat study in few months was recommended. July 9, 2020 overall she was doing quite well. Still some symptoms of neuropathy from earlier treatment but clinically no signs of recurrence. Regarding carcinoma the breast her plan being continuing to follow her progress through exams and mammograms. She has been getting her mammograms per her PCP as well as Dr. Lavinia Simpson her gynecologist. Other issue has been a tiny 8 mm nodule in noted on the CT scan of the chest that had been stable but we will recheck her CAT scan again in 4 to 6months. We also been monitoring her tumor markers specially with aggressive nature of the disease. I did advise her that on her next visit it quite likely would be 1 of my partners who would be seeing her      Dr Ced Meng Notes:  ------------------------  Left Breast cancer: Diagnosed in 8/18/2017 stage IIb, T2 N1 M0. Triple negative. Underwent lumpectomy followed by chemotherapy with Adriamycin, Cytoxan followed by Taxol. Completed adjuvant radiation. August 2022 mammogram normal  March 2023 CA 27.29 31.2. Will follow per NCCN guidelines. CKD: Creatinine 1.4 Stable. Avoid nephrotoxic medications and adequate hydration. Follows with Dr Mack Tafoya. Lung nodule: Ct chest  with stable 0.8cm nodule in sep 2022. Will repeat in Sept 2023. HTN: Maintain a log and discuss with PCP, Recommend weight loss, exercise and low salt diet. Neuropathy: Is on Neurontin. Discussed epsom salt soaoks and prescribed compounding cream.    Continue the medical care.     Discussed above findings and plan with her and she verbalized understanding.  Answered all her questions.    Discussed healthy lifestyle including weight loss, healthy diet and regular exercise as tolerated.  Also discussed importance of being up-to-date with age-appropriate screening tools. Colonoscopy 6-7 years ago 2014 maybe, is not sure when repeat is recommended. Asked her to call Dr Washington.     Recommend follow-up with primary care physician and other specialist.    Return to clinic in Sept 2023 or earlier as needed    Scribe Authentication Statement  Norbert Orlando scribed portions of this documentation for and in the presence of Paty Nunn MD 3/10/23 1:55 PM EST     Provider Authentication Statement  I, Paty Nunn M.D., personally performed the services described in this documentation and they were scribed in my presence by Norbert Orlando MA.   The documentation is both accurate and complete.     Paty Nunn MD     This note is created with the assistance of a speech-recognition program. While intending to generate a document that accurately reflects the content of the visit, no guarantee can be provided that every mistake has been identified and corrected by editing.    ARNOL

## 2023-03-10 ENCOUNTER — CLINICAL DOCUMENTATION (OUTPATIENT)
Dept: ONCOLOGY | Age: 78
End: 2023-03-10

## 2023-03-10 ENCOUNTER — OFFICE VISIT (OUTPATIENT)
Dept: ONCOLOGY | Age: 78
End: 2023-03-10
Payer: MEDICARE

## 2023-03-10 ENCOUNTER — HOSPITAL ENCOUNTER (OUTPATIENT)
Dept: INFUSION THERAPY | Age: 78
Discharge: HOME | End: 2023-03-10
Payer: MEDICARE

## 2023-03-10 VITALS
OXYGEN SATURATION: 95 % | HEIGHT: 63 IN | BODY MASS INDEX: 31.36 KG/M2 | WEIGHT: 177 LBS | DIASTOLIC BLOOD PRESSURE: 63 MMHG | SYSTOLIC BLOOD PRESSURE: 137 MMHG | RESPIRATION RATE: 18 BRPM | HEART RATE: 94 BPM

## 2023-03-10 DIAGNOSIS — R91.1 NODULE OF RIGHT LUNG: ICD-10-CM

## 2023-03-10 DIAGNOSIS — N28.9 KIDNEY FUNCTION ABNORMAL: ICD-10-CM

## 2023-03-10 DIAGNOSIS — C50.412 MALIGNANT NEOPLASM OF UPPER-OUTER QUADRANT OF LEFT FEMALE BREAST, UNSPECIFIED ESTROGEN RECEPTOR STATUS (HCC): Primary | ICD-10-CM

## 2023-03-10 PROCEDURE — G8427 DOCREV CUR MEDS BY ELIG CLIN: HCPCS | Performed by: INTERNAL MEDICINE

## 2023-03-10 PROCEDURE — 1123F ACP DISCUSS/DSCN MKR DOCD: CPT | Performed by: INTERNAL MEDICINE

## 2023-03-10 PROCEDURE — 1090F PRES/ABSN URINE INCON ASSESS: CPT | Performed by: INTERNAL MEDICINE

## 2023-03-10 PROCEDURE — G8417 CALC BMI ABV UP PARAM F/U: HCPCS | Performed by: INTERNAL MEDICINE

## 2023-03-10 PROCEDURE — 3075F SYST BP GE 130 - 139MM HG: CPT | Performed by: INTERNAL MEDICINE

## 2023-03-10 PROCEDURE — G8484 FLU IMMUNIZE NO ADMIN: HCPCS | Performed by: INTERNAL MEDICINE

## 2023-03-10 PROCEDURE — 1036F TOBACCO NON-USER: CPT | Performed by: INTERNAL MEDICINE

## 2023-03-10 PROCEDURE — 3078F DIAST BP <80 MM HG: CPT | Performed by: INTERNAL MEDICINE

## 2023-03-10 PROCEDURE — G8400 PT W/DXA NO RESULTS DOC: HCPCS | Performed by: INTERNAL MEDICINE

## 2023-03-10 PROCEDURE — 99211 OFF/OP EST MAY X REQ PHY/QHP: CPT

## 2023-03-10 PROCEDURE — 99214 OFFICE O/P EST MOD 30 MIN: CPT | Performed by: INTERNAL MEDICINE

## 2023-03-10 RX ORDER — GABAPENTIN 600 MG/1
600 TABLET ORAL 3 TIMES DAILY
Qty: 90 TABLET | Refills: 5 | Status: SHIPPED | OUTPATIENT
Start: 2023-03-10 | End: 2023-04-09

## 2023-03-10 NOTE — PROGRESS NOTES
Per Dr. Ron Barnett - order for neuropathy compound cream signed and faxed to [facility].

## 2023-08-16 ENCOUNTER — TELEPHONE (OUTPATIENT)
Dept: ONCOLOGY | Age: 78
End: 2023-08-16

## 2023-08-16 NOTE — TELEPHONE ENCOUNTER
8/16/23 - spoke w/ pt for the 9/11/23 CT scan at [facility] arrival time of 12:30 - not contrast - no prep

## 2023-09-11 ENCOUNTER — HOSPITAL ENCOUNTER (OUTPATIENT)
Dept: CT IMAGING | Age: 78
Discharge: HOME | End: 2023-09-11
Attending: INTERNAL MEDICINE
Payer: MEDICARE

## 2023-09-11 ENCOUNTER — HOSPITAL ENCOUNTER (OUTPATIENT)
Age: 78
Discharge: HOME | End: 2023-09-11
Attending: INTERNAL MEDICINE
Payer: MEDICARE

## 2023-09-11 DIAGNOSIS — R91.1 NODULE OF RIGHT LUNG: ICD-10-CM

## 2023-09-11 DIAGNOSIS — C50.412 MALIGNANT NEOPLASM OF UPPER-OUTER QUADRANT OF LEFT FEMALE BREAST, UNSPECIFIED ESTROGEN RECEPTOR STATUS (HCC): ICD-10-CM

## 2023-09-11 DIAGNOSIS — N28.9 KIDNEY FUNCTION ABNORMAL: ICD-10-CM

## 2023-09-11 LAB
ALBUMIN SERPL-MCNC: 3.9 GM/DL (ref 3.4–5)
ALP BLD-CCNC: 103 IU/L (ref 40–128)
ALT SERPL-CCNC: 14 U/L (ref 10–40)
ANION GAP SERPL CALCULATED.3IONS-SCNC: 11 MMOL/L (ref 4–16)
AST SERPL-CCNC: 22 IU/L (ref 15–37)
BASOPHILS ABSOLUTE: 0 K/CU MM
BASOPHILS RELATIVE PERCENT: 0.5 % (ref 0–1)
BILIRUB SERPL-MCNC: 0.7 MG/DL (ref 0–1)
BUN SERPL-MCNC: 22 MG/DL (ref 6–23)
CALCIUM SERPL-MCNC: 9.1 MG/DL (ref 8.3–10.6)
CHLORIDE BLD-SCNC: 111 MMOL/L (ref 99–110)
CO2: 24 MMOL/L (ref 21–32)
CREAT SERPL-MCNC: 1.4 MG/DL (ref 0.6–1.1)
DIFFERENTIAL TYPE: ABNORMAL
EOSINOPHILS ABSOLUTE: 0.3 K/CU MM
EOSINOPHILS RELATIVE PERCENT: 4.4 % (ref 0–3)
GFR SERPL CREATININE-BSD FRML MDRD: 38 ML/MIN/1.73M2
GLUCOSE SERPL-MCNC: 88 MG/DL (ref 70–99)
HCT VFR BLD CALC: 36.8 % (ref 37–47)
HEMOGLOBIN: 11.7 GM/DL (ref 12.5–16)
IMMATURE NEUTROPHIL %: 0.2 % (ref 0–0.43)
LYMPHOCYTES ABSOLUTE: 2 K/CU MM
LYMPHOCYTES RELATIVE PERCENT: 31.5 % (ref 24–44)
MCH RBC QN AUTO: 27.1 PG (ref 27–31)
MCHC RBC AUTO-ENTMCNC: 31.8 % (ref 32–36)
MCV RBC AUTO: 85.4 FL (ref 78–100)
MONOCYTES ABSOLUTE: 0.7 K/CU MM
MONOCYTES RELATIVE PERCENT: 10.6 % (ref 0–4)
NUCLEATED RBC %: 0 %
PDW BLD-RTO: 17.3 % (ref 11.7–14.9)
PLATELET # BLD: 183 K/CU MM (ref 140–440)
PMV BLD AUTO: 12.9 FL (ref 7.5–11.1)
POTASSIUM SERPL-SCNC: 4.3 MMOL/L (ref 3.5–5.1)
RBC # BLD: 4.31 M/CU MM (ref 4.2–5.4)
SEGMENTED NEUTROPHILS ABSOLUTE COUNT: 3.4 K/CU MM
SEGMENTED NEUTROPHILS RELATIVE PERCENT: 52.8 % (ref 36–66)
SODIUM BLD-SCNC: 146 MMOL/L (ref 135–145)
TOTAL IMMATURE NEUTOROPHIL: 0.01 K/CU MM
TOTAL NUCLEATED RBC: 0 K/CU MM
TOTAL PROTEIN: 6.4 GM/DL (ref 6.4–8.2)
WBC # BLD: 6.4 K/CU MM (ref 4–10.5)

## 2023-09-11 PROCEDURE — 86300 IMMUNOASSAY TUMOR CA 15-3: CPT

## 2023-09-11 PROCEDURE — 36415 COLL VENOUS BLD VENIPUNCTURE: CPT

## 2023-09-11 PROCEDURE — 80053 COMPREHEN METABOLIC PANEL: CPT

## 2023-09-11 PROCEDURE — 71250 CT THORAX DX C-: CPT

## 2023-09-11 PROCEDURE — 85025 COMPLETE CBC W/AUTO DIFF WBC: CPT

## 2023-09-11 NOTE — TELEPHONE ENCOUNTER
Patient contacted our office in need of refill for GABAPENTIN. Patient has a scheduled appointment on 9/15/23. Patients preferred pharmacy is [facility]. Pending for patients chart provider MEL MCKEON.

## 2023-09-12 RX ORDER — GABAPENTIN 600 MG/1
600 TABLET ORAL 3 TIMES DAILY
Qty: 90 TABLET | Refills: 5 | Status: SHIPPED | OUTPATIENT
Start: 2023-09-12 | End: 2024-03-10

## 2023-09-13 LAB — CANCER AG27-29 SERPL-ACNC: 26.8 U/ML

## 2023-09-15 ENCOUNTER — HOSPITAL ENCOUNTER (OUTPATIENT)
Dept: INFUSION THERAPY | Age: 78
Discharge: HOME | End: 2023-09-15
Payer: MEDICARE

## 2023-09-15 ENCOUNTER — OFFICE VISIT (OUTPATIENT)
Dept: ONCOLOGY | Age: 78
End: 2023-09-15
Payer: MEDICARE

## 2023-09-15 VITALS
TEMPERATURE: 97.3 F | DIASTOLIC BLOOD PRESSURE: 76 MMHG | BODY MASS INDEX: 30.83 KG/M2 | WEIGHT: 174 LBS | HEIGHT: 63 IN | OXYGEN SATURATION: 98 % | RESPIRATION RATE: 18 BRPM | SYSTOLIC BLOOD PRESSURE: 157 MMHG | HEART RATE: 80 BPM

## 2023-09-15 DIAGNOSIS — C50.412 MALIGNANT NEOPLASM OF UPPER-OUTER QUADRANT OF LEFT FEMALE BREAST, UNSPECIFIED ESTROGEN RECEPTOR STATUS (HCC): Primary | ICD-10-CM

## 2023-09-15 PROCEDURE — 99214 OFFICE O/P EST MOD 30 MIN: CPT | Performed by: INTERNAL MEDICINE

## 2023-09-15 PROCEDURE — G8427 DOCREV CUR MEDS BY ELIG CLIN: HCPCS | Performed by: INTERNAL MEDICINE

## 2023-09-15 PROCEDURE — 1123F ACP DISCUSS/DSCN MKR DOCD: CPT | Performed by: INTERNAL MEDICINE

## 2023-09-15 PROCEDURE — G8417 CALC BMI ABV UP PARAM F/U: HCPCS | Performed by: INTERNAL MEDICINE

## 2023-09-15 PROCEDURE — 3078F DIAST BP <80 MM HG: CPT | Performed by: INTERNAL MEDICINE

## 2023-09-15 PROCEDURE — 3077F SYST BP >= 140 MM HG: CPT | Performed by: INTERNAL MEDICINE

## 2023-09-15 PROCEDURE — 99211 OFF/OP EST MAY X REQ PHY/QHP: CPT

## 2023-09-15 PROCEDURE — G8400 PT W/DXA NO RESULTS DOC: HCPCS | Performed by: INTERNAL MEDICINE

## 2023-09-15 PROCEDURE — 1036F TOBACCO NON-USER: CPT | Performed by: INTERNAL MEDICINE

## 2023-09-15 PROCEDURE — 1090F PRES/ABSN URINE INCON ASSESS: CPT | Performed by: INTERNAL MEDICINE

## 2023-09-15 ASSESSMENT — PATIENT HEALTH QUESTIONNAIRE - PHQ9
SUM OF ALL RESPONSES TO PHQ QUESTIONS 1-9: 0
SUM OF ALL RESPONSES TO PHQ9 QUESTIONS 1 & 2: 0
SUM OF ALL RESPONSES TO PHQ QUESTIONS 1-9: 0
SUM OF ALL RESPONSES TO PHQ QUESTIONS 1-9: 0
1. LITTLE INTEREST OR PLEASURE IN DOING THINGS: 0
2. FEELING DOWN, DEPRESSED OR HOPELESS: 0
SUM OF ALL RESPONSES TO PHQ QUESTIONS 1-9: 0

## 2023-09-15 NOTE — PROGRESS NOTES
MA Rooming Questions  Patient: Megha Mcgee  MRN: [id number]    Date: 9/15/2023        1. Do you have any new issues?   no         2. Do you need any refills on medications?    no    3. Have you had any imaging done since your last visit? yes - ct scan 9/11    4. Have you been hospitalized or seen in the emergency room since your last visit here?   no    5. Did the patient have a depression screening completed today?  Yes    No data recorded     PHQ-9 Given to (if applicable):               PHQ-9 Score (if applicable):                     [] Positive     []  Negative              Does question #9 need addressed (if applicable)                     [] Yes    []  No               Manda Camacho MA
years. Do not think we need follow up Ct chest    HTN: Maintain a log and discuss with PCP, Recommend weight loss, exercise and low salt diet. Neuropathy: Is on Neurontin. Stable. Would like to taper dose. Discussed epsom salt soaoks and prescribed compounding cream.    Na 146, Recommend avoiding salt rich foods     Continue the medical care. Discussed above findings and plan with her and she verbalized understanding. Answered all her questions. Discussed healthy lifestyle including weight loss, healthy diet and regular exercise as tolerated. Also discussed importance of being up-to-date with age-appropriate screening tools. Colonoscopy 6-7 years ago 2014 maybe, is not sure when repeat is recommended. Asked her to call Dr Favio Sotelo. Recommend follow-up with primary care physician and other specialist.    Return to clinic in march 2024 or earlier as needed    This note is created with the assistance of a speech-recognition program. While intending to generate a document that accurately reflects the content of the visit, no guarantee can be provided that every mistake has been identified and corrected by editing. Time Spent with patient for face to face, exam, education, discussing treatment options, reviewing imaging, reviewing labs, decision making, Pre-charting, and documenting today's visit, >30 mins.      Sanaz

## 2023-09-18 RX ORDER — GABAPENTIN 600 MG/1
600 TABLET ORAL 3 TIMES DAILY
Qty: 90 TABLET | Refills: 5 | OUTPATIENT
Start: 2023-09-18

## 2023-10-25 RX ORDER — GABAPENTIN 600 MG/1
600 TABLET ORAL 3 TIMES DAILY
Qty: 90 TABLET | Refills: 10 | OUTPATIENT
Start: 2023-10-25

## 2023-11-08 RX ORDER — GABAPENTIN 600 MG/1
600 TABLET ORAL 3 TIMES DAILY
Qty: 90 TABLET | Refills: 10 | OUTPATIENT
Start: 2023-11-08

## 2023-11-09 NOTE — TELEPHONE ENCOUNTER
Attempted to call pharmacy to [address] for Gabapentin, as pt needed it sent to [address] [place] [address]. Had to leave msg at [facility] to inform them to [address]

## 2023-11-10 RX ORDER — GABAPENTIN 600 MG/1
600 TABLET ORAL 3 TIMES DAILY
Qty: 90 TABLET | Refills: 5 | Status: SHIPPED | OUTPATIENT
Start: 2023-11-10 | End: 2024-05-08

## 2023-12-06 ENCOUNTER — CLINICAL DOCUMENTATION (OUTPATIENT)
Dept: ONCOLOGY | Age: 78
End: 2023-12-06

## 2023-12-06 NOTE — PROGRESS NOTES
Patient needing a renewal for her handicap placard. Per Dr. Danuta Valencia - letter given to patient for 2-year placard.

## 2024-01-15 RX ORDER — GABAPENTIN 600 MG/1
600 TABLET ORAL 3 TIMES DAILY
Qty: 15 TABLET | Refills: 0 | Status: SHIPPED | OUTPATIENT
Start: 2024-01-15 | End: 2024-01-20

## 2024-01-15 NOTE — TELEPHONE ENCOUNTER
Patient in stating she has ordered her refill of gabapentin with [facility] but will not receive it for several days - requesting a small refill at local pharmacy to hold her over.  Per Dr. Nunn - gabapentin 600 mg one tab tid #15 with no refills e-scripted to [facility].

## 2024-03-15 ENCOUNTER — OFFICE VISIT (OUTPATIENT)
Dept: ONCOLOGY | Age: 79
End: 2024-03-15
Payer: MEDICARE

## 2024-03-15 ENCOUNTER — HOSPITAL ENCOUNTER (OUTPATIENT)
Dept: INFUSION THERAPY | Age: 79
Discharge: HOME | End: 2024-03-15
Payer: MEDICARE

## 2024-03-15 VITALS
HEART RATE: 85 BPM | HEIGHT: 63 IN | BODY MASS INDEX: 30.72 KG/M2 | SYSTOLIC BLOOD PRESSURE: 146 MMHG | WEIGHT: 173.4 LBS | TEMPERATURE: 97.5 F | OXYGEN SATURATION: 100 % | DIASTOLIC BLOOD PRESSURE: 68 MMHG

## 2024-03-15 DIAGNOSIS — N28.9 KIDNEY FUNCTION ABNORMAL: ICD-10-CM

## 2024-03-15 DIAGNOSIS — R91.1 NODULE OF RIGHT LUNG: ICD-10-CM

## 2024-03-15 DIAGNOSIS — C50.412 MALIGNANT NEOPLASM OF UPPER-OUTER QUADRANT OF LEFT FEMALE BREAST, UNSPECIFIED ESTROGEN RECEPTOR STATUS (HCC): ICD-10-CM

## 2024-03-15 DIAGNOSIS — C50.412 MALIGNANT NEOPLASM OF UPPER-OUTER QUADRANT OF LEFT FEMALE BREAST, UNSPECIFIED ESTROGEN RECEPTOR STATUS (HCC): Primary | ICD-10-CM

## 2024-03-15 DIAGNOSIS — Z12.31 SCREENING MAMMOGRAM, ENCOUNTER FOR: ICD-10-CM

## 2024-03-15 LAB
ALBUMIN SERPL-MCNC: 4.1 GM/DL (ref 3.4–5)
ALP BLD-CCNC: 120 IU/L (ref 40–128)
ALT SERPL-CCNC: 16 U/L (ref 10–40)
ANION GAP SERPL CALCULATED.3IONS-SCNC: 10 MMOL/L (ref 7–16)
AST SERPL-CCNC: 21 IU/L (ref 15–37)
BASOPHILS ABSOLUTE: 0 K/CU MM
BASOPHILS RELATIVE PERCENT: 0.2 % (ref 0–1)
BILIRUB SERPL-MCNC: 0.6 MG/DL (ref 0–1)
BUN SERPL-MCNC: 25 MG/DL (ref 6–23)
CALCIUM SERPL-MCNC: 9.8 MG/DL (ref 8.3–10.6)
CHLORIDE BLD-SCNC: 107 MMOL/L (ref 99–110)
CO2: 27 MMOL/L (ref 21–32)
CREAT SERPL-MCNC: 1.3 MG/DL (ref 0.6–1.1)
DIFFERENTIAL TYPE: ABNORMAL
EOSINOPHILS ABSOLUTE: 0.2 K/CU MM
EOSINOPHILS RELATIVE PERCENT: 1.7 % (ref 0–3)
GFR SERPL CREATININE-BSD FRML MDRD: 42 ML/MIN/1.73M2
GLUCOSE SERPL-MCNC: 78 MG/DL (ref 70–99)
HCT VFR BLD CALC: 37.8 % (ref 37–47)
HEMOGLOBIN: 12.8 GM/DL (ref 12.5–16)
LYMPHOCYTES ABSOLUTE: 2.3 K/CU MM
LYMPHOCYTES RELATIVE PERCENT: 25.9 % (ref 24–44)
MCH RBC QN AUTO: 27.5 PG (ref 27–31)
MCHC RBC AUTO-ENTMCNC: 33.9 % (ref 32–36)
MCV RBC AUTO: 81.3 FL (ref 78–100)
MONOCYTES ABSOLUTE: 0.7 K/CU MM
MONOCYTES RELATIVE PERCENT: 7.5 % (ref 0–4)
PDW BLD-RTO: 18.5 % (ref 11.7–14.9)
PLATELET # BLD: 203 K/CU MM (ref 140–440)
PMV BLD AUTO: 11 FL (ref 7.5–11.1)
POTASSIUM SERPL-SCNC: 3.7 MMOL/L (ref 3.5–5.1)
RBC # BLD: 4.65 M/CU MM (ref 4.2–5.4)
SEGMENTED NEUTROPHILS ABSOLUTE COUNT: 5.8 K/CU MM
SEGMENTED NEUTROPHILS RELATIVE PERCENT: 64.7 % (ref 36–66)
SODIUM BLD-SCNC: 144 MMOL/L (ref 135–145)
TOTAL PROTEIN: 7.1 GM/DL (ref 6.4–8.2)
WBC # BLD: 9 K/CU MM (ref 4–10.5)

## 2024-03-15 PROCEDURE — 99213 OFFICE O/P EST LOW 20 MIN: CPT | Performed by: INTERNAL MEDICINE

## 2024-03-15 PROCEDURE — G8400 PT W/DXA NO RESULTS DOC: HCPCS | Performed by: INTERNAL MEDICINE

## 2024-03-15 PROCEDURE — 1123F ACP DISCUSS/DSCN MKR DOCD: CPT | Performed by: INTERNAL MEDICINE

## 2024-03-15 PROCEDURE — G8417 CALC BMI ABV UP PARAM F/U: HCPCS | Performed by: INTERNAL MEDICINE

## 2024-03-15 PROCEDURE — G8484 FLU IMMUNIZE NO ADMIN: HCPCS | Performed by: INTERNAL MEDICINE

## 2024-03-15 PROCEDURE — G8427 DOCREV CUR MEDS BY ELIG CLIN: HCPCS | Performed by: INTERNAL MEDICINE

## 2024-03-15 PROCEDURE — 99211 OFF/OP EST MAY X REQ PHY/QHP: CPT

## 2024-03-15 PROCEDURE — 80053 COMPREHEN METABOLIC PANEL: CPT

## 2024-03-15 PROCEDURE — 86300 IMMUNOASSAY TUMOR CA 15-3: CPT

## 2024-03-15 PROCEDURE — 1036F TOBACCO NON-USER: CPT | Performed by: INTERNAL MEDICINE

## 2024-03-15 PROCEDURE — 36415 COLL VENOUS BLD VENIPUNCTURE: CPT

## 2024-03-15 PROCEDURE — 3077F SYST BP >= 140 MM HG: CPT | Performed by: INTERNAL MEDICINE

## 2024-03-15 PROCEDURE — 1090F PRES/ABSN URINE INCON ASSESS: CPT | Performed by: INTERNAL MEDICINE

## 2024-03-15 PROCEDURE — 85025 COMPLETE CBC W/AUTO DIFF WBC: CPT

## 2024-03-15 PROCEDURE — 3078F DIAST BP <80 MM HG: CPT | Performed by: INTERNAL MEDICINE

## 2024-03-15 NOTE — PROGRESS NOTES
MA Rooming Questions  Patient: Elena Espinoza  MRN: [id number]    Date: 3/15/2024        1. Do you have any new issues?   no         2. Do you need any refills on medications?    no    3. Have you had any imaging done since your last visit?   yes -     4. Have you been hospitalized or seen in the emergency room since your last visit here?   no    5. Did the patient have a depression screening completed today? No    No data recorded     PHQ-9 Given to (if applicable):               PHQ-9 Score (if applicable):                     [] Positive     []  Negative              Does question #9 need addressed (if applicable)                     [] Yes    []  No               Thi Vasquez MA    
neuropathy but did not feel that she needed to take gabapentin. She also complained of Lovenox being too expensive and wanted to switch over to Xarelto.     Visit here on May 10, 2018, was being treated with Xarelto 20 milligrams daily, tolerating it quite well. The swelling in the leg had improved and she had not noticed any bleeding anywhere. She was undergoing radiation therapy treatments and so far had been tolerating it fairly well.    She was seen in the office on July 10, 2018, had finished receiving radiation therapy treatments with mild degree of mobility, had recovered fairly well. Major issue still was symptoms of peripheral neuropathy poorly controlled with gabapentin 600 mg twice a day. If it was not for neuropathy she in general would be feeling quite well. She was finished receiving treatments with Lovenox.    After finishing above-mentioned treatments she did see Marlee on August 15, 2018 for survivorship visit.    She was seen in the office on September 11, 2018 was doing quite well stated had a mammogram per Dr. Nathan late July 2018 and that was unremarkable.    She did undergo bone scan per Dr. Nathan on January 23, 2019 for muscle and joint discomfort study was unremarkable.    She was seen in the office on March 12, 2019. Still was having symptoms of peripheral neuropathy was taking gabapentin 600 mg twice a day did encourage her to take maybe 3 times a day.     July 18, 2019 did spend time with her. Overall she was doing well some symptoms of neuropathy. Also a few weeks history of dysphagia. Exam was fairly unremarkable. Blood work and tumor markers were drawn and advised her to have CT scans of the neck as well as chest as part of work-up for hoarseness of voice. She was also advised to see Dr. Parsons for the same.     CT scan of the neck done on July 25, 2019 was basically negative for any distinct mass. CT scan of the chest done on July 25, 2019 2 was negative for any significant

## 2024-03-18 LAB — CANCER AG27-29 SERPL-ACNC: 24.3 U/ML

## 2024-07-09 RX ORDER — GABAPENTIN 600 MG/1
600 TABLET ORAL 3 TIMES DAILY
Qty: 15 TABLET | Refills: 0 | Status: SHIPPED | OUTPATIENT
Start: 2024-07-09 | End: 2024-07-14

## 2024-07-09 NOTE — TELEPHONE ENCOUNTER
Patient left message requesting a refill for gabapentin to be sent to [facility] pharmacy. Pending RX to Provider to be sent to pharmacy.

## 2024-07-17 RX ORDER — GABAPENTIN 600 MG/1
600 TABLET ORAL 3 TIMES DAILY
Qty: 90 TABLET | Refills: 0 | Status: SHIPPED | OUTPATIENT
Start: 2024-07-17 | End: 2024-07-18 | Stop reason: SDUPTHER

## 2024-07-18 ENCOUNTER — TELEPHONE (OUTPATIENT)
Dept: ONCOLOGY | Age: 79
End: 2024-07-18

## 2024-07-18 DIAGNOSIS — G62.9 NEUROPATHY: Primary | ICD-10-CM

## 2024-07-18 RX ORDER — GABAPENTIN 600 MG/1
600 TABLET ORAL 3 TIMES DAILY
Qty: 90 TABLET | Refills: 0 | Status: SHIPPED | OUTPATIENT
Start: 2024-07-18 | End: 2024-08-17

## 2024-07-18 NOTE — TELEPHONE ENCOUNTER
Patient would like her [facility] to be [facility] and new RX for gabapentin sent to [facility] ASAP she is be going out of town on 7/22/24.

## 2024-07-18 NOTE — TELEPHONE ENCOUNTER
Per Dr. Nunn - refills of patient's Gabapentin 600 mg tid x30 days #90 with no refills e-scripted to [facility].

## 2024-08-16 DIAGNOSIS — G62.9 NEUROPATHY: ICD-10-CM

## 2024-08-16 RX ORDER — GABAPENTIN 600 MG/1
600 TABLET ORAL 3 TIMES DAILY
Qty: 90 TABLET | Refills: 5 | Status: SHIPPED | OUTPATIENT
Start: 2024-08-16 | End: 2025-02-12

## 2024-09-12 ENCOUNTER — HOSPITAL ENCOUNTER (OUTPATIENT)
Dept: INFUSION THERAPY | Age: 79
Discharge: HOME | End: 2024-09-12
Payer: MEDICARE

## 2024-09-12 DIAGNOSIS — C50.412 MALIGNANT NEOPLASM OF UPPER-OUTER QUADRANT OF LEFT FEMALE BREAST, UNSPECIFIED ESTROGEN RECEPTOR STATUS (HCC): ICD-10-CM

## 2024-09-12 LAB
ALBUMIN SERPL-MCNC: 3.9 G/DL (ref 3.4–5)
ALBUMIN/GLOB SERPL: 1.5 {RATIO} (ref 1.1–2.2)
ALP SERPL-CCNC: 100 U/L (ref 40–129)
ALT SERPL-CCNC: 15 U/L (ref 10–40)
ANION GAP SERPL CALCULATED.3IONS-SCNC: 11 MMOL/L (ref 9–17)
AST SERPL-CCNC: 24 U/L (ref 15–37)
BASOPHILS # BLD: 0.02 K/UL
BASOPHILS NFR BLD: 0 % (ref 0–1)
BILIRUB SERPL-MCNC: 0.6 MG/DL (ref 0–1)
BUN SERPL-MCNC: 24 MG/DL (ref 7–20)
CALCIUM SERPL-MCNC: 9.2 MG/DL (ref 8.3–10.6)
CHLORIDE SERPL-SCNC: 108 MMOL/L (ref 99–110)
CO2 SERPL-SCNC: 24 MMOL/L (ref 21–32)
CREAT SERPL-MCNC: 1.4 MG/DL (ref 0.6–1.2)
EOSINOPHIL # BLD: 0.27 K/UL
EOSINOPHILS RELATIVE PERCENT: 4 % (ref 0–3)
ERYTHROCYTE [DISTWIDTH] IN BLOOD BY AUTOMATED COUNT: 18.1 % (ref 11.7–14.9)
GFR, ESTIMATED: 35 ML/MIN/1.73M2
GLUCOSE SERPL-MCNC: 95 MG/DL (ref 74–99)
HCT VFR BLD AUTO: 37.5 % (ref 37–47)
HGB BLD-MCNC: 12.4 G/DL (ref 12.5–16)
LYMPHOCYTES NFR BLD: 2.42 K/UL
LYMPHOCYTES RELATIVE PERCENT: 37 % (ref 24–44)
MCH RBC QN AUTO: 27.3 PG (ref 27–31)
MCHC RBC AUTO-ENTMCNC: 33.1 G/DL (ref 32–36)
MCV RBC AUTO: 82.4 FL (ref 78–100)
MONOCYTES NFR BLD: 0.68 K/UL
MONOCYTES NFR BLD: 10 % (ref 0–4)
NEUTROPHILS NFR BLD: 49 % (ref 36–66)
NEUTS SEG NFR BLD: 3.19 K/UL
PLATELET # BLD AUTO: 181 K/UL (ref 140–440)
PMV BLD AUTO: 11 FL (ref 7.5–11.1)
POTASSIUM SERPL-SCNC: 4.1 MMOL/L (ref 3.5–5.1)
PROT SERPL-MCNC: 6.4 G/DL (ref 6.4–8.2)
RBC # BLD AUTO: 4.55 M/UL (ref 4.2–5.4)
SODIUM SERPL-SCNC: 142 MMOL/L (ref 136–145)
WBC OTHER # BLD: 6.6 K/UL (ref 4–10.5)

## 2024-09-12 PROCEDURE — 36415 COLL VENOUS BLD VENIPUNCTURE: CPT

## 2024-09-12 PROCEDURE — 85025 COMPLETE CBC W/AUTO DIFF WBC: CPT

## 2024-09-12 PROCEDURE — 86300 IMMUNOASSAY TUMOR CA 15-3: CPT

## 2024-09-12 PROCEDURE — 80053 COMPREHEN METABOLIC PANEL: CPT

## 2024-09-19 ENCOUNTER — HOSPITAL ENCOUNTER (OUTPATIENT)
Dept: INFUSION THERAPY | Age: 79
Discharge: HOME | End: 2024-09-19
Payer: MEDICARE

## 2024-09-19 ENCOUNTER — OFFICE VISIT (OUTPATIENT)
Dept: ONCOLOGY | Age: 79
End: 2024-09-19

## 2024-09-19 VITALS
HEIGHT: 62 IN | DIASTOLIC BLOOD PRESSURE: 72 MMHG | HEART RATE: 73 BPM | TEMPERATURE: 97.5 F | BODY MASS INDEX: 31.47 KG/M2 | WEIGHT: 171 LBS | OXYGEN SATURATION: 100 % | SYSTOLIC BLOOD PRESSURE: 158 MMHG

## 2024-09-19 DIAGNOSIS — M79.602 LEFT ARM PAIN: ICD-10-CM

## 2024-09-19 DIAGNOSIS — C50.412 MALIGNANT NEOPLASM OF UPPER-OUTER QUADRANT OF LEFT FEMALE BREAST, UNSPECIFIED ESTROGEN RECEPTOR STATUS (HCC): Primary | ICD-10-CM

## 2024-09-19 PROCEDURE — 99211 OFF/OP EST MAY X REQ PHY/QHP: CPT

## 2024-09-19 RX ORDER — AMLODIPINE BESYLATE 5 MG/1
5 TABLET ORAL DAILY
COMMUNITY

## 2024-09-20 ENCOUNTER — TELEPHONE (OUTPATIENT)
Dept: ONCOLOGY | Age: 79
End: 2024-09-20

## 2024-09-23 LAB — CANCER AG27-29 SERPL-ACNC: 33 U/ML (ref 0–38)

## 2024-10-01 ENCOUNTER — HOSPITAL ENCOUNTER (OUTPATIENT)
Dept: MRI IMAGING | Age: 79
Discharge: HOME | End: 2024-10-01
Attending: INTERNAL MEDICINE
Payer: MEDICARE

## 2024-10-01 DIAGNOSIS — C50.412 MALIGNANT NEOPLASM OF UPPER-OUTER QUADRANT OF LEFT FEMALE BREAST, UNSPECIFIED ESTROGEN RECEPTOR STATUS (HCC): ICD-10-CM

## 2024-10-01 DIAGNOSIS — M79.602 LEFT ARM PAIN: ICD-10-CM

## 2024-10-01 PROCEDURE — 73221 MRI JOINT UPR EXTREM W/O DYE: CPT

## 2025-02-15 DIAGNOSIS — G62.9 NEUROPATHY: ICD-10-CM

## 2025-02-16 RX ORDER — GABAPENTIN 600 MG/1
600 TABLET ORAL 3 TIMES DAILY
Qty: 90 TABLET | Refills: 0 | Status: SHIPPED | OUTPATIENT
Start: 2025-02-16 | End: 2025-03-18

## 2025-03-18 DIAGNOSIS — G62.9 NEUROPATHY: ICD-10-CM

## 2025-03-19 RX ORDER — GABAPENTIN 600 MG/1
600 TABLET ORAL 3 TIMES DAILY
Qty: 90 TABLET | Refills: 5 | Status: SHIPPED | OUTPATIENT
Start: 2025-03-19 | End: 2025-04-18

## 2025-04-02 ENCOUNTER — HOSPITAL ENCOUNTER (OUTPATIENT)
Dept: INFUSION THERAPY | Age: 80
Discharge: HOME | End: 2025-04-02
Payer: MEDICARE

## 2025-04-02 DIAGNOSIS — C50.412 MALIGNANT NEOPLASM OF UPPER-OUTER QUADRANT OF LEFT FEMALE BREAST, UNSPECIFIED ESTROGEN RECEPTOR STATUS: ICD-10-CM

## 2025-04-02 DIAGNOSIS — M79.602 LEFT ARM PAIN: ICD-10-CM

## 2025-04-02 LAB
ALBUMIN SERPL-MCNC: 3.7 G/DL (ref 3.4–5)
ALBUMIN/GLOB SERPL: 1.2 {RATIO} (ref 1.1–2.2)
ALP SERPL-CCNC: 98 U/L (ref 40–129)
ALT SERPL-CCNC: 14 U/L (ref 10–40)
ANION GAP SERPL CALCULATED.3IONS-SCNC: 10 MMOL/L (ref 9–17)
AST SERPL-CCNC: 27 U/L (ref 15–37)
BASOPHILS # BLD: 0.02 K/UL
BASOPHILS NFR BLD: 0 % (ref 0–1)
BILIRUB SERPL-MCNC: 0.7 MG/DL (ref 0–1)
BUN SERPL-MCNC: 29 MG/DL (ref 7–20)
CALCIUM SERPL-MCNC: 9.9 MG/DL (ref 8.3–10.6)
CHLORIDE SERPL-SCNC: 107 MMOL/L (ref 99–110)
CO2 SERPL-SCNC: 23 MMOL/L (ref 21–32)
CREAT SERPL-MCNC: 1.4 MG/DL (ref 0.6–1.2)
EOSINOPHIL # BLD: 0.23 K/UL
EOSINOPHILS RELATIVE PERCENT: 3 % (ref 0–3)
ERYTHROCYTE [DISTWIDTH] IN BLOOD BY AUTOMATED COUNT: 18.4 % (ref 11.7–14.9)
GFR, ESTIMATED: 38 ML/MIN/1.73M2
GLUCOSE SERPL-MCNC: 87 MG/DL (ref 74–99)
HCT VFR BLD AUTO: 38.5 % (ref 37–47)
HGB BLD-MCNC: 12.4 G/DL (ref 12.5–16)
LYMPHOCYTES NFR BLD: 2.24 K/UL
LYMPHOCYTES RELATIVE PERCENT: 32 % (ref 24–44)
MCH RBC QN AUTO: 27.2 PG (ref 27–31)
MCHC RBC AUTO-ENTMCNC: 32.2 G/DL (ref 32–36)
MCV RBC AUTO: 84.4 FL (ref 78–100)
MONOCYTES NFR BLD: 0.68 K/UL
MONOCYTES NFR BLD: 10 % (ref 0–4)
NEUTROPHILS NFR BLD: 54 % (ref 36–66)
NEUTS SEG NFR BLD: 3.79 K/UL
PLATELET # BLD AUTO: 160 K/UL (ref 140–440)
PMV BLD AUTO: 10.5 FL (ref 7.5–11.1)
POTASSIUM SERPL-SCNC: 4.2 MMOL/L (ref 3.5–5.1)
PROT SERPL-MCNC: 7 G/DL (ref 6.4–8.2)
RBC # BLD AUTO: 4.56 M/UL (ref 4.2–5.4)
SODIUM SERPL-SCNC: 141 MMOL/L (ref 136–145)
WBC OTHER # BLD: 7 K/UL (ref 4–10.5)

## 2025-04-02 PROCEDURE — 36415 COLL VENOUS BLD VENIPUNCTURE: CPT

## 2025-04-02 PROCEDURE — 86300 IMMUNOASSAY TUMOR CA 15-3: CPT

## 2025-04-02 PROCEDURE — 85025 COMPLETE CBC W/AUTO DIFF WBC: CPT

## 2025-04-02 PROCEDURE — 80053 COMPREHEN METABOLIC PANEL: CPT

## 2025-04-08 ENCOUNTER — OFFICE VISIT (OUTPATIENT)
Dept: ONCOLOGY | Age: 80
End: 2025-04-08
Payer: MEDICARE

## 2025-04-08 VITALS
BODY MASS INDEX: 31.98 KG/M2 | HEIGHT: 62 IN | TEMPERATURE: 97.6 F | HEART RATE: 69 BPM | SYSTOLIC BLOOD PRESSURE: 145 MMHG | DIASTOLIC BLOOD PRESSURE: 76 MMHG | OXYGEN SATURATION: 100 % | WEIGHT: 173.8 LBS

## 2025-04-08 DIAGNOSIS — M79.602 LEFT ARM PAIN: ICD-10-CM

## 2025-04-08 DIAGNOSIS — C50.412 MALIGNANT NEOPLASM OF UPPER-OUTER QUADRANT OF LEFT FEMALE BREAST, UNSPECIFIED ESTROGEN RECEPTOR STATUS: Primary | ICD-10-CM

## 2025-04-08 DIAGNOSIS — G62.9 NEUROPATHY: ICD-10-CM

## 2025-04-08 LAB
MISCELLANEOUS LAB TEST RESULT: NORMAL
TEST NAME: NORMAL

## 2025-04-08 PROCEDURE — 1036F TOBACCO NON-USER: CPT | Performed by: INTERNAL MEDICINE

## 2025-04-08 PROCEDURE — 1123F ACP DISCUSS/DSCN MKR DOCD: CPT | Performed by: INTERNAL MEDICINE

## 2025-04-08 PROCEDURE — G8400 PT W/DXA NO RESULTS DOC: HCPCS | Performed by: INTERNAL MEDICINE

## 2025-04-08 PROCEDURE — 1126F AMNT PAIN NOTED NONE PRSNT: CPT | Performed by: INTERNAL MEDICINE

## 2025-04-08 PROCEDURE — 99214 OFFICE O/P EST MOD 30 MIN: CPT | Performed by: INTERNAL MEDICINE

## 2025-04-08 PROCEDURE — 3077F SYST BP >= 140 MM HG: CPT | Performed by: INTERNAL MEDICINE

## 2025-04-08 PROCEDURE — G8417 CALC BMI ABV UP PARAM F/U: HCPCS | Performed by: INTERNAL MEDICINE

## 2025-04-08 PROCEDURE — 3078F DIAST BP <80 MM HG: CPT | Performed by: INTERNAL MEDICINE

## 2025-04-08 PROCEDURE — 1090F PRES/ABSN URINE INCON ASSESS: CPT | Performed by: INTERNAL MEDICINE

## 2025-04-08 PROCEDURE — G8427 DOCREV CUR MEDS BY ELIG CLIN: HCPCS | Performed by: INTERNAL MEDICINE

## 2025-04-08 PROCEDURE — 1159F MED LIST DOCD IN RCRD: CPT | Performed by: INTERNAL MEDICINE

## 2025-04-08 ASSESSMENT — PATIENT HEALTH QUESTIONNAIRE - PHQ9
SUM OF ALL RESPONSES TO PHQ QUESTIONS 1-9: 1
2. FEELING DOWN, DEPRESSED OR HOPELESS: SEVERAL DAYS
1. LITTLE INTEREST OR PLEASURE IN DOING THINGS: NOT AT ALL

## 2025-04-08 NOTE — PROGRESS NOTES
MA Rooming Questions  Patient: Elena Espinoza  MRN: [id number]    Date: 4/8/2025        1. Do you have any new issues?   yes - Patient would like to know if there is an alternate medication she can take for the neuropathy in her hands, she is concerned it may be causing additional tremors.          2. Do you need any refills on medications?    no    3. Have you had any imaging done since your last visit?   yes - labs 4/2    4. Have you been hospitalized or seen in the emergency room since your last visit here?   no    5. Did the patient have a depression screening completed today? Yes    PHQ-9 Total Score: 1 (4/8/2025  2:10 PM)       PHQ-9 Given to (if applicable):               PHQ-9 Score (if applicable):                     [] Positive     []  Negative              Does question #9 need addressed (if applicable)                     [] Yes    []  No               Joellen Cameron CMA      
been a tiny 8 mm nodule in noted on the CT scan of the chest that had been stable but we will recheck her CAT scan again in 4 to 6months. We also been monitoring her tumor markers specially with aggressive nature of the disease.    I did advise her that on her next visit it quite likely would be 1 of my partners who would be seeing her      Dr Avila Notes:  ------------------------  Left Breast cancer: Diagnosed in 8/18/2017 stage IIb, T2 N1 M0.  Triple negative.  Underwent lumpectomy followed by chemotherapy with Adriamycin, Cytoxan followed by Taxol.  Completed adjuvant radiation.    Sep 2024 mammogram normal  March 2025 CA 27-29 pending  Otherwise overall clinically stable  Will follow per NCCN guidelines.     Left arm pain: ?etiology. Chronic edema since surgery.  MRI of the extremity with rotator cuff tear.  Recommend PT.    CKD: Creatinine Stable as of sep 2024   Avoid nephrotoxic medications and adequate hydration. Follows with Dr Jimenez.     Lung nodule: Ct chest  with stable 0.8cm nodule in sep 2023.Looks like stable for at least 5 years. Do not think we need follow up Ct chest    HTN: Maintain a log and discuss with PCP, Recommend weight loss, exercise and low salt diet.     Neuropathy: Is on Neurontin. Would like to taper dose. Discussed epsom salt soaoks and prescribed compounding cream, which did not help much.    Continue the medical care.    Discussed above findings and plan with her and she verbalized understanding.  Answered all her questions.    Discussed healthy lifestyle including weight loss, healthy diet and regular exercise as tolerated.  Also discussed importance of being up-to-date with age-appropriate screening tools. Colonoscopy 6-7 years ago 2014 maybe, is not sure when repeat is recommended. Asked her to call Dr Washington.     Recommend follow-up with primary care physician and other specialist.    Return to clinic in oct 2025  or earlier as needed    This note is created with the

## 2025-04-22 ENCOUNTER — HOSPITAL ENCOUNTER (OUTPATIENT)
Dept: PHYSICAL THERAPY | Age: 80
Setting detail: THERAPIES SERIES
Discharge: HOME | End: 2025-04-22
Payer: MEDICARE

## 2025-04-22 PROCEDURE — 97110 THERAPEUTIC EXERCISES: CPT

## 2025-04-22 PROCEDURE — 97161 PT EVAL LOW COMPLEX 20 MIN: CPT

## 2025-04-22 NOTE — FLOWSHEET NOTE
Outpatient Physical Therapy  [place]           [x] Phone: [phone]   Fax: [phone]  [place]           [] Phone: [phone]   Fax: [phone]        Physical Therapy Daily Treatment Note  Date:  2025    Patient Name:  Elena Espinoza    :  1945  MRN: [id number]  Restrictions/Precautions: Restrictions/Precautions: None        Diagnosis:   Malignant neoplasm of upper-outer quadrant of left female breast, unspecified estrogen receptor status [C50.412]  Left arm pain [M79.602]  Neuropathy [G62.9]    Date of Injury/Surgery:   Treatment Diagnosis:  decreased LUE ROM and strength  Insurance/Certification information: [employer] medicare- $40 copay BOMN with precert  Referring Physician:  Paty Nunn MD     PCP: Esa Castillo APRN - CNP  Next Doctor Visit:    Plan of care signed (Y/N):  sent   Outcome Measure: QD: 25%   Visit# / total visits:  1 /10  10 approved by    TE, NR, MAN, TA, VASO approved only   Pain level: 0/10 at rest    Goals:     Patient goals: reduce pain  Short term goals  Time Frame for Short term goals: refer to [facility]       Long Term Goals  Time Frame for Long Term Goals: 10 visits  Pt will report overall improvement in condition by 50% or more  pt will improve QD to 16% or less to show MDC and subjective improvement with ADLS/IADLS  pt will improve L shoulder flexion AROM to 155 deg for improved reaching into kitchen cabinets  pt will improve L shoulder flexion strength to 4+/5 for improved ADLS  pt will improve L shoulder ER AROM to C4 for improved brushing and washing her hair      Summary of Evaluation:  Assessment: Pt is a 81 yo R handed female that presents to therapy with complaints of L shoulder pain and weakness. Per L shoulder MRI: Supraspinatus tendinopathy with partial-thickness tearing of the distal fibers.      Infraspinatus and subscapularis tendinopathy without tear. Long head biceps tendinopathy without tear.      Degenerative changes

## 2025-04-22 NOTE — PROGRESS NOTES
education & training, Patient/Caregiver education & training, Modalities, Therapeutic activities     Frequency / Duration:  Patient to be seen 2 for 5 weeks      Eval Complexity:    Decision Making: Low Complexity       Therapist Signature: Brianna Hernandez PT  DPT, Cert. DN     Date: 4/22/2025     I certify that the above Therapy Services are being furnished while the patient is under my care. I agree with the treatment plan and certify that this therapy is necessary.      Physician's Signature:  ___________________________   Date:_______                                                                   Paty Nunn MD        Physician Comments: _______________________________________________    Please sign and return to [facility] PHYSICAL THERAP[facility].  Please fax to the location listed below. THANK YOU for this referral!    [facility] PHYSICAL THERAP[facility]  [address]  [facility] [address]  Dept: [phone]  Dept Fax: [phone]  Loc: [phone]       POC NOTE

## 2025-04-23 NOTE — PRE-CERTIFICATION NOTE
PT APPROVED FOR 10 VISITS INCLUDES DAY OF EVAL    [id number]    04/22/25-5/30/25    [facility]#  [id number]

## 2025-04-29 ENCOUNTER — HOSPITAL ENCOUNTER (OUTPATIENT)
Dept: PHYSICAL THERAPY | Age: 80
Setting detail: THERAPIES SERIES
Discharge: HOME | End: 2025-04-29
Payer: MEDICARE

## 2025-04-29 PROCEDURE — 97110 THERAPEUTIC EXERCISES: CPT

## 2025-04-29 PROCEDURE — 97530 THERAPEUTIC ACTIVITIES: CPT

## 2025-04-29 NOTE — FLOWSHEET NOTE
daily - 7 x weekly - 2 sets - 10 reps  - Supine Shoulder Alphabet  - 1 x daily - 7 x weekly - 2 sets - 10 reps    4/22: Access Code: [email or web address]   URL: [email or web address]   Date: 04/22/2025   Prepared by: Paulette Hernandez, PT, DPT, Cert. DN       Exercises   - Supine Shoulder Flexion Extension AAROM with Dowel  - 2 x daily - 7 x weekly - 2 sets - 10 reps - 5 hold   - Seated Scapular Retraction  - 2 x daily - 7 x weekly - 2 sets - 10 reps - 3 hold   - Seated Cervical Sidebending Stretch  - 2 x daily - 7 x weekly - 2 sets - 30 hold       Manual Treatments:  none      Modalities:  none      Communication with other providers:  POC sent       Assessment:  Pt with a good tolerance towards today's treatment session.Pt was able to increase activity in the gym today.  Pt able to complete all activities without any report of  exacerbation of symptoms. Pt would continue to benefit from skilled therapy interventions to increase ROM and strength.   Pt rated pain at 1/10 after treatment.              Plan for Next Session:   Specific Instructions for Next Treatment: LUR ROM/strength, manual    Time In / Time Out:  1353/ 1433  TE, NR, MAN, TA, VASO approved only     Timed Code/Total Treatment Minutes:  40'/ 40' 2 TE ( 32') 1 TA (8')       Next Progress Note due:  10th visit       Plan of Care Interventions:  [x] Therapeutic Exercise  [x] Modalities:  [x] Therapeutic Activity     [] Ultrasound  [] Estim  [] Gait Training      [] Cervical Traction [] Lumbar Traction  [x] Neuromuscular Re-education    [] Cold/hotpack [] Iontophoresis   [x] Instruction in HEP      [x] Vasopneumatic   [] Dry Needling    [x] Manual Therapy               [] Aquatic Therapy              Electronically signed by:  Ellie Little PTA  4/29/2025, 12:30 PM     4/29/2025,3:24 PM

## 2025-05-01 ENCOUNTER — HOSPITAL ENCOUNTER (OUTPATIENT)
Dept: PHYSICAL THERAPY | Age: 80
Discharge: HOME | End: 2025-05-01
Payer: MEDICARE

## 2025-05-01 PROCEDURE — 97110 THERAPEUTIC EXERCISES: CPT

## 2025-05-01 NOTE — FLOWSHEET NOTE
goals, anatomy and physiology related to condition        Home Exercise Program:  4/29/2025 Access Code: [email or web address]  URL: [email or web address]  Date: 04/29/2025  Prepared by: Ellie Little    Exercises  - Supine Shoulder Press with Dowel  - 1 x daily - 7 x weekly - 2 sets - 10 reps  - Sidelying Shoulder Abduction Palm Forward  - 1 x daily - 7 x weekly - 2 sets - 10 reps  - Sidelying Shoulder External Rotation  - 1 x daily - 7 x weekly - 2 sets - 10 reps  - Supine Shoulder Alphabet  - 1 x daily - 7 x weekly - 2 sets - 10 reps    4/22: Access Code: [email or web address]   URL: [email or web address]   Date: 04/22/2025   Prepared by: Paulette Hernandez, PT, DPT, Cert. DN       Exercises   - Supine Shoulder Flexion Extension AAROM with Dowel  - 2 x daily - 7 x weekly - 2 sets - 10 reps - 5 hold   - Seated Scapular Retraction  - 2 x daily - 7 x weekly - 2 sets - 10 reps - 3 hold   - Seated Cervical Sidebending Stretch  - 2 x daily - 7 x weekly - 2 sets - 30 hold       Manual Treatments:  none      Modalities:  none      Communication with other providers:  POC sent       Assessment:  Pt with a good tolerance towards today's treatment session.Pt was able to increase activity in the gym today.  Pt able to complete all activities without any report of  exacerbation of symptoms. Pt would continue to benefit from skilled therapy interventions to increase ROM and strength.   Pt rated pain at 1/10 after treatment.              Plan for Next Session:   Specific Instructions for Next Treatment: LUR ROM/strength, manual    Time In / Time Out:  1345/ 1425    TE, NR, MAN, TA, VASO approved only     Timed Code/Total Treatment Minutes:  40'/ 40' 3 TE ( 40')      Next Progress Note due:  10th visit       Plan of Care Interventions:  [x] Therapeutic Exercise  [x] Modalities:  [x] Therapeutic Activity     [] Ultrasound  [] Estim  [] Gait Training      [] Cervical Traction [] Lumbar Traction  [x] Neuromuscular Re-education    [] Cold/hotpack []

## 2025-05-13 ENCOUNTER — HOSPITAL ENCOUNTER (OUTPATIENT)
Dept: PHYSICAL THERAPY | Age: 80
Discharge: HOME | End: 2025-05-13
Payer: MEDICARE

## 2025-05-13 PROCEDURE — 97110 THERAPEUTIC EXERCISES: CPT

## 2025-05-13 PROCEDURE — 97140 MANUAL THERAPY 1/> REGIONS: CPT

## 2025-05-13 NOTE — FLOWSHEET NOTE
Outpatient Physical Therapy  [place]           [x] Phone: [phone]   Fax: [phone]  [place]           [] Phone: [phone]   Fax: [phone]        Physical Therapy Daily Treatment Note  Date:  2025    Patient Name:  Elena Espinoza    :  1945  MRN: [id number]  Restrictions/Precautions: Restrictions/Precautions: None        Diagnosis:   Malignant neoplasm of upper-outer quadrant of left female breast, unspecified estrogen receptor status [C50.412]  Left arm pain [M79.602]  Neuropathy [G62.9]    Date of Injury/Surgery:   Treatment Diagnosis:  decreased LUE ROM and strength  Insurance/Certification information: [employer] medicare- $40 copay BOMN with precert  Referring Physician:  Paty Nunn MD     PCP: Esa Castillo APRN - CNP  Next Doctor Visit:    Plan of care signed (Y/N):  sent   Outcome Measure: QD: 25%   Visit# / total visits:  4 /10  10 approved by    TE, NR, MAN, TA, VASO approved only   Pain level:  /10    Goals:     Patient goals: reduce pain  Short term goals  Time Frame for Short term goals: refer to LT       Long Term Goals  Time Frame for Long Term Goals: 10 visits  Pt will report overall improvement in condition by 50% or more  pt will improve QD to 16% or less to show MDC and subjective improvement with ADLS/IADLS  pt will improve L shoulder flexion AROM to 155 deg for improved reaching into kitchen cabinets  pt will improve L shoulder flexion strength to 4+/5 for improved ADLS  pt will improve L shoulder ER AROM to C4 for improved brushing and washing her hair      Summary of Evaluation:  Assessment: Pt is a 79 yo R handed female that presents to therapy with complaints of L shoulder pain and weakness. Per L shoulder MRI: Supraspinatus tendinopathy with partial-thickness tearing of the distal fibers.      Infraspinatus and subscapularis tendinopathy without tear. Long head biceps tendinopathy without tear.      Degenerative changes glenohumeral

## 2025-05-15 ENCOUNTER — HOSPITAL ENCOUNTER (OUTPATIENT)
Dept: PHYSICAL THERAPY | Age: 80
Discharge: HOME | End: 2025-05-15
Payer: MEDICARE

## 2025-05-15 PROCEDURE — 97140 MANUAL THERAPY 1/> REGIONS: CPT

## 2025-05-15 PROCEDURE — 97110 THERAPEUTIC EXERCISES: CPT

## 2025-05-15 NOTE — FLOWSHEET NOTE
man (10') 2 TE (35')       Next Progress Note due:  10th visit       Plan of Care Interventions:  [x] Therapeutic Exercise  [x] Modalities:  [x] Therapeutic Activity     [] Ultrasound  [] Estim  [] Gait Training      [] Cervical Traction [] Lumbar Traction  [x] Neuromuscular Re-education    [] Cold/hotpack [] Iontophoresis   [x] Instruction in HEP      [x] Vasopneumatic   [] Dry Needling    [x] Manual Therapy               [] Aquatic Therapy              Electronically signed by:  Ellie Little PTA  5/15/2025, 10:34 AM         5/15/2025,3:14 PM

## 2025-05-20 ENCOUNTER — HOSPITAL ENCOUNTER (OUTPATIENT)
Dept: PHYSICAL THERAPY | Age: 80
Discharge: HOME | End: 2025-05-20
Payer: MEDICARE

## 2025-05-20 PROCEDURE — 97110 THERAPEUTIC EXERCISES: CPT

## 2025-05-20 PROCEDURE — 97140 MANUAL THERAPY 1/> REGIONS: CPT

## 2025-05-20 NOTE — FLOWSHEET NOTE
Program:  4/29/2025 Access Code: [email or web address]  URL: [email or web address]  Date: 04/29/2025  Prepared by: Ellie Little    Exercises  - Supine Shoulder Press with Dowel  - 1 x daily - 7 x weekly - 2 sets - 10 reps  - Sidelying Shoulder Abduction Palm Forward  - 1 x daily - 7 x weekly - 2 sets - 10 reps  - Sidelying Shoulder External Rotation  - 1 x daily - 7 x weekly - 2 sets - 10 reps  - Supine Shoulder Alphabet  - 1 x daily - 7 x weekly - 2 sets - 10 reps    4/22: Access Code: [email or web address]   URL: [email or web address]   Date: 04/22/2025   Prepared by: Paulette Hernandez, PT, DPT, Cert. DN       Exercises   - Supine Shoulder Flexion Extension AAROM with Dowel  - 2 x daily - 7 x weekly - 2 sets - 10 reps - 5 hold   - Seated Scapular Retraction  - 2 x daily - 7 x weekly - 2 sets - 10 reps - 3 hold   - Seated Cervical Sidebending Stretch  - 2 x daily - 7 x weekly - 2 sets - 30 hold       Manual Treatments:  STM/MFR/TPR/hypervolt L UT/LS       Modalities:  none      Communication with other providers:  none this date       Assessment:   Pt tolerated treatment well today.  Elena continues to have many trigger points in the L UT/LS. Manual therapy was beneficial today to reduce tenderness and size of muscle knot.Pt would benefit from continued skilled physical therapy to address remaining impairments and limitations, progress toward goal completion, promote independence with ADLs, and prevent further injury. end pain: 0/10              Plan for Next Session:   Specific Instructions for Next Treatment: LUE  ROM/strength, manual    Time In / Time Out:  1345/1423    TE, NR, MAN, TA, VASO approved only     Timed Code/Total Treatment Minutes:  38/38: 2 man 1 TE       Next Progress Note due:  10th visit       Plan of Care Interventions:  [x] Therapeutic Exercise  [x] Modalities:  [x] Therapeutic Activity     [] Ultrasound  [] Estim  [] Gait Training      [] Cervical Traction [] Lumbar Traction  [x] Neuromuscular Re-education

## 2025-05-22 ENCOUNTER — HOSPITAL ENCOUNTER (OUTPATIENT)
Dept: PHYSICAL THERAPY | Age: 80
Discharge: HOME | End: 2025-05-22
Payer: MEDICARE

## 2025-05-22 PROCEDURE — 97140 MANUAL THERAPY 1/> REGIONS: CPT

## 2025-05-22 PROCEDURE — 97110 THERAPEUTIC EXERCISES: CPT

## 2025-05-22 NOTE — FLOWSHEET NOTE
Outpatient Physical Therapy  [place]           [x] Phone: [phone]   Fax: [phone]  [place]           [] Phone: [phone]   Fax: [phone]        Physical Therapy Daily Treatment Note  Date:  2025    Patient Name:  Elena Espinoza     :  1945  MRN: [id number]  Restrictions/Precautions: Restrictions/Precautions: None        Diagnosis:   Malignant neoplasm of upper-outer quadrant of left female breast, unspecified estrogen receptor status [C50.412]  Left arm pain [M79.602]  Neuropathy [G62.9]    Date of Injury/Surgery:   Treatment Diagnosis:  decreased LUE ROM and strength  Insurance/Certification information: human[employer] medicare- $40 copay BOMN with precert  Referring Physician:  Paty Nunn MD     PCP: Esa Castillo APRN - CNP  Next Doctor Visit:    Plan of care signed (Y/N):  sent   Outcome Measure: QD: 25%   Visit# / total visits: 7/10  10 approved by    TE, NR, MAN, TA, VASO approved only   Pain level: 0 /10    Goals:     Patient goals: reduce pain  Short term goals  Time Frame for Short term goals: refer to LTG       Long Term Goals  Time Frame for Long Term Goals: 10 visits  Pt will report overall improvement in condition by 50% or more  pt will improve QD to 16% or less to show MDC and subjective improvement with ADLS/IADLS  pt will improve L shoulder flexion AROM to 155 deg for improved reaching into kitchen cabinets  pt will improve L shoulder flexion strength to 4+/5 for improved ADLS  pt will improve L shoulder ER AROM to C4 for improved brushing and washing her hair      Summary of Evaluation:  Assessment: Pt is a 79 yo R handed female that presents to therapy with complaints of L shoulder pain and weakness. Per L shoulder MRI: Supraspinatus tendinopathy with partial-thickness tearing of the distal fibers.      Infraspinatus and subscapularis tendinopathy without tear. Long head biceps tendinopathy without tear.      Degenerative changes glenohumeral

## 2025-05-27 ENCOUNTER — HOSPITAL ENCOUNTER (OUTPATIENT)
Dept: PHYSICAL THERAPY | Age: 80
Discharge: HOME | End: 2025-05-27

## 2025-05-27 NOTE — FLOWSHEET NOTE
Physical Therapy  Cancellation/No-show Note  Patient Name:  Elena Espinoza  :  1945   Date:  2025  Cancelled visits to date: 1  No-shows to date: 1    For today's appointment patient:  [x]  Cancelled  []  Rescheduled appointment  []  No-show     Reason given by patient:  []  Patient ill  [x]  Conflicting appointment  []  No transportation    []  Conflict with work  []  No reason given  []  Other:     Comments:      Electronically signed by:  Brianna Hernandez, PT, DPT, Cert. DN

## 2025-05-30 ENCOUNTER — HOSPITAL ENCOUNTER (OUTPATIENT)
Dept: PHYSICAL THERAPY | Age: 80
Discharge: HOME | End: 2025-05-30
Payer: MEDICARE

## 2025-05-30 PROCEDURE — 97140 MANUAL THERAPY 1/> REGIONS: CPT

## 2025-05-30 PROCEDURE — 97110 THERAPEUTIC EXERCISES: CPT

## 2025-05-30 NOTE — DISCHARGE SUMMARY
Outpatient Physical Therapy           [place]           [x] Phone: [phone]   Fax: [phone]  Mobile           [] Phone: [phone]   Fax: [phone]      To: Paty Nunn MD     From: Brianna Hernandez, PT, DPT, Cert. DN        Patient: Elena Espinoza                    : 1945  Diagnosis:  Malignant neoplasm of upper-outer quadrant of left female breast, unspecified estrogen receptor status (HCC) [C50.412]  Left arm pain [M79.602]  Neuropathy [G62.9]        Treatment Diagnosis:   decreased LUE ROM and strength     Date: 2025  []  Progress Note                [x]  Discharge Note    Evaluation Date:  25   Total Visits to date:  8  Cancels/No-shows to date:  2    Subjective:     she feels that overall her pain has reduced since starting therapy. She feels that she has improved by 60% overall since starting therapy. She would like to make today her last day secondary to the copay. QD: 23%     Plan of Care/Treatment to date:  [x] Therapeutic Exercise    [x] Modalities:  [x] Therapeutic Activity     [] Ultrasound  [] Electrical Stimulation  [] Gait Training      [] Cervical Traction   [] Lumbar Traction  [x] Neuromuscular Re-education  [] Cold/hotpack [] Iontophoresis  [x] Instruction in HEP      Other:  [x] Manual Therapy       [x]  Vasopneumatic  [] Aquatic Therapy       []   Dry Needle Therapy                      Objective/Significant Findings At Last Visit/Comments:    L shoulder flexion AROM: 146 deg   L shoulder flexion strength: 4/5   L shoulder ER AROM: to C2    Assessment:     Pt has shown good progress since therapy start regarding improved ROM and strength, although still limited. Pt has met one of her goals at this time. She would like today to be her last day of therapy secondary to copay.  PT educated pt on continuation of HEP after discharge for max benefits and reduced risk for future decline. Pt discharged this date     Goal Status:  [x] Achieved [x] Partially

## 2025-05-30 NOTE — FLOWSHEET NOTE
Outpatient Physical Therapy  [place]           [x] Phone: [phone]   Fax: [phone]  [place]           [] Phone: [phone]   Fax: [phone]        Physical Therapy Daily Treatment Note  Date:  2025    Patient Name:  Elena Espinoza     :  1945  MRN: [id number]  Restrictions/Precautions: Restrictions/Precautions: None        Diagnosis:   Malignant neoplasm of upper-outer quadrant of left female breast, unspecified estrogen receptor status [C50.412]  Left arm pain [M79.602]  Neuropathy [G62.9]    Date of Injury/Surgery:   Treatment Diagnosis:  decreased LUE ROM and strength  Insurance/Certification information: [employer]- $40 copay BOMN with precert  Referring Physician:  Paty Nunn MD     PCP: Esa Castillo APRN - CNP  Next Doctor Visit:    Plan of care signed (Y/N):  sent   Outcome Measure: QD: 23%   Visit# / total visits: 8/10  10 approved by    TE, NR, MAN, TA, VASO approved only   Pain level: 1 /10     Goals:     Patient goals: reduce pain met   Short term goals  Time Frame for Short term goals: refer to [facility]     Long Term Goals  Time Frame for Long Term Goals: 10 visits  Pt will report overall improvement in condition by 50% or more met   pt will improve QD to 16% or less to show MDC and subjective improvement with ADLS/IADLS not met   pt will improve L shoulder flexion AROM to 155 deg for improved reaching into kitchen cabinets almost met   pt will improve L shoulder flexion strength to 4+/5 for improved ADLS almost met   pt will improve L shoulder ER AROM to C4 for improved brushing and washing her hair almost met       Summary of Evaluation:  Assessment: Pt is a 81 yo R handed female that presents to therapy with complaints of L shoulder pain and weakness. Per L shoulder MRI: Supraspinatus tendinopathy with partial-thickness tearing of the distal fibers.      Infraspinatus and subscapularis tendinopathy without tear. Long head biceps tendinopathy

## 2025-07-02 DIAGNOSIS — M89.8X9 BONE MASS: Primary | ICD-10-CM

## 2025-07-15 ENCOUNTER — OFFICE VISIT (OUTPATIENT)
Age: 80
End: 2025-07-15

## 2025-07-15 VITALS
HEIGHT: 63 IN | BODY MASS INDEX: 29.59 KG/M2 | WEIGHT: 167 LBS | SYSTOLIC BLOOD PRESSURE: 130 MMHG | DIASTOLIC BLOOD PRESSURE: 80 MMHG

## 2025-07-15 DIAGNOSIS — Z12.31 ENCOUNTER FOR SCREENING MAMMOGRAM FOR MALIGNANT NEOPLASM OF BREAST: ICD-10-CM

## 2025-07-15 DIAGNOSIS — Z85.3 HISTORY OF BREAST CANCER: Primary | ICD-10-CM

## 2025-07-15 DIAGNOSIS — Z01.419 ENCOUNTER FOR ANNUAL ROUTINE GYNECOLOGICAL EXAMINATION: ICD-10-CM

## 2025-07-15 SDOH — ECONOMIC STABILITY: FOOD INSECURITY: WITHIN THE PAST 12 MONTHS, YOU WORRIED THAT YOUR FOOD WOULD RUN OUT BEFORE YOU GOT MONEY TO BUY MORE.: NEVER TRUE

## 2025-07-15 SDOH — ECONOMIC STABILITY: FOOD INSECURITY: WITHIN THE PAST 12 MONTHS, THE FOOD YOU BOUGHT JUST DIDN'T LAST AND YOU DIDN'T HAVE MONEY TO GET MORE.: NEVER TRUE

## 2025-07-15 ASSESSMENT — ENCOUNTER SYMPTOMS
SHORTNESS OF BREATH: 0
ABDOMINAL PAIN: 0

## 2025-07-15 NOTE — PROGRESS NOTES
Take 1 capsule by mouth daily      gabapentin (NEURONTIN) 600 MG tablet Take 1 tablet by mouth 3 times daily for 30 days. 90 tablet 5     No current facility-administered medications for this visit.     No Known Allergies  Immunization History   Administered Date(s) Administered    COVID-19, MODERNA BLUE border, Primary or Immunocompromised, (age 12y+), IM, 100 mcg/0.5mL 2021, 2021, 2021, 2022    COVID-19, PFIZER, , (age 12y+), IM, 30mcg/0.3mL 10/21/2024    Influenza Virus Vaccine 2013, 2019, 09/10/2020    Influenza, FLUAD, (age 65 y+), IM, Quadv, 0.5mL 10/23/2023    Influenza, FLUAD, (age 65 y+), IM, Trivalent PF, 0.5mL 2024    Influenza, FLUZONE High Dose (age 65 y+), IM, Quadv, 0.7mL 09/10/2020, 10/09/2021, 10/19/2022    Influenza, FLUZONE High Dose, (age 65 y+), IM, Trivalent PF, 0.5mL 2014, 10/14/2015, 10/12/2016, 2018, 10/17/2019    Pneumococcal, PCV-13, PREVNAR 13, (age 6w+), IM, 0.5mL 05/10/2017    Pneumococcal, PPSV23, PNEUMOVAX 23, (age 2y+), SC/IM, 0.5mL 2013          Review of Systems   Constitutional:  Negative for fatigue and fever.   Respiratory:  Negative for shortness of breath.    Cardiovascular:  Negative for chest pain and palpitations.   Gastrointestinal:  Negative for abdominal pain.   Genitourinary:  Negative for difficulty urinating, dyspareunia, dysuria, menstrual problem and pelvic pain.   Skin:  Negative for rash.   Neurological:  Negative for dizziness.       /80 (BP Site: Left Upper Arm, Patient Position: Sitting, BP Cuff Size: Large Adult)   Ht 1.6 m (5' 3\")   Wt 75.8 kg (167 lb)   BMI 29.58 kg/m²   Physical Exam  Exam conducted with a chaperone present.   Constitutional:       Appearance: Normal appearance.   Neck:      Thyroid: No thyroid mass, thyromegaly or thyroid tenderness.   Cardiovascular:      Rate and Rhythm: Normal rate and regular rhythm.      Heart sounds: Normal heart sounds.   Pulmonary: